# Patient Record
Sex: FEMALE | Race: WHITE | NOT HISPANIC OR LATINO
[De-identification: names, ages, dates, MRNs, and addresses within clinical notes are randomized per-mention and may not be internally consistent; named-entity substitution may affect disease eponyms.]

---
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
## 2020-01-08 ENCOUNTER — TRANSCRIPTION ENCOUNTER (OUTPATIENT)
Age: 20
End: 2020-01-08

## 2020-02-05 ENCOUNTER — TRANSCRIPTION ENCOUNTER (OUTPATIENT)
Age: 20
End: 2020-02-05

## 2020-02-17 ENCOUNTER — TRANSCRIPTION ENCOUNTER (OUTPATIENT)
Age: 20
End: 2020-02-17

## 2020-02-26 ENCOUNTER — APPOINTMENT (OUTPATIENT)
Dept: OTOLARYNGOLOGY | Facility: CLINIC | Age: 20
End: 2020-02-26

## 2020-08-04 ENCOUNTER — TRANSCRIPTION ENCOUNTER (OUTPATIENT)
Age: 20
End: 2020-08-04

## 2020-09-30 ENCOUNTER — APPOINTMENT (OUTPATIENT)
Dept: INTERNAL MEDICINE | Facility: CLINIC | Age: 20
End: 2020-09-30
Payer: COMMERCIAL

## 2020-09-30 ENCOUNTER — NON-APPOINTMENT (OUTPATIENT)
Age: 20
End: 2020-09-30

## 2020-09-30 VITALS
SYSTOLIC BLOOD PRESSURE: 120 MMHG | HEART RATE: 82 BPM | WEIGHT: 142 LBS | DIASTOLIC BLOOD PRESSURE: 70 MMHG | RESPIRATION RATE: 12 BRPM | TEMPERATURE: 97.2 F

## 2020-09-30 VITALS — BODY MASS INDEX: 23.63 KG/M2 | HEIGHT: 65 IN

## 2020-09-30 DIAGNOSIS — Z78.9 OTHER SPECIFIED HEALTH STATUS: ICD-10-CM

## 2020-09-30 LAB — CYTOLOGY CVX/VAG DOC THIN PREP: NORMAL

## 2020-09-30 PROCEDURE — G0442 ANNUAL ALCOHOL SCREEN 15 MIN: CPT | Mod: NC

## 2020-09-30 PROCEDURE — G0444 DEPRESSION SCREEN ANNUAL: CPT | Mod: NC,59

## 2020-09-30 PROCEDURE — 93000 ELECTROCARDIOGRAM COMPLETE: CPT | Mod: 59

## 2020-09-30 PROCEDURE — 99385 PREV VISIT NEW AGE 18-39: CPT | Mod: 25

## 2020-09-30 PROCEDURE — 36415 COLL VENOUS BLD VENIPUNCTURE: CPT

## 2020-09-30 RX ORDER — NORGESTIMATE AND ETHINYL ESTRADIOL 0.25-0.035
0.25-35 KIT ORAL
Refills: 0 | Status: ACTIVE | COMMUNITY

## 2020-10-02 LAB
ALBUMIN SERPL ELPH-MCNC: 4.6 G/DL
ALP BLD-CCNC: 46 U/L
ALT SERPL-CCNC: 25 U/L
ANION GAP SERPL CALC-SCNC: 14 MMOL/L
APPEARANCE: CLEAR
AST SERPL-CCNC: 25 U/L
BACTERIA: NEGATIVE
BASOPHILS # BLD AUTO: 0.03 K/UL
BASOPHILS NFR BLD AUTO: 0.4 %
BILIRUB SERPL-MCNC: 0.3 MG/DL
BILIRUBIN URINE: NEGATIVE
BLOOD URINE: NEGATIVE
BUN SERPL-MCNC: 10 MG/DL
CALCIUM SERPL-MCNC: 10 MG/DL
CHLORIDE SERPL-SCNC: 103 MMOL/L
CHOLEST SERPL-MCNC: 300 MG/DL
CHOLEST/HDLC SERPL: 3.4 RATIO
CO2 SERPL-SCNC: 23 MMOL/L
COLOR: COLORLESS
CREAT SERPL-MCNC: 0.8 MG/DL
EOSINOPHIL # BLD AUTO: 0.17 K/UL
EOSINOPHIL NFR BLD AUTO: 2.4 %
GLUCOSE QUALITATIVE U: NEGATIVE
GLUCOSE SERPL-MCNC: 84 MG/DL
HCT VFR BLD CALC: 42.1 %
HDLC SERPL-MCNC: 88 MG/DL
HGB BLD-MCNC: 13.2 G/DL
HYALINE CASTS: 0 /LPF
IMM GRANULOCYTES NFR BLD AUTO: 0.1 %
KETONES URINE: NEGATIVE
LDLC SERPL CALC-MCNC: 163 MG/DL
LEUKOCYTE ESTERASE URINE: NEGATIVE
LYMPHOCYTES # BLD AUTO: 3.05 K/UL
LYMPHOCYTES NFR BLD AUTO: 43.3 %
MAN DIFF?: NORMAL
MCHC RBC-ENTMCNC: 28.1 PG
MCHC RBC-ENTMCNC: 31.4 GM/DL
MCV RBC AUTO: 89.6 FL
MICROSCOPIC-UA: NORMAL
MONOCYTES # BLD AUTO: 0.51 K/UL
MONOCYTES NFR BLD AUTO: 7.2 %
NEUTROPHILS # BLD AUTO: 3.28 K/UL
NEUTROPHILS NFR BLD AUTO: 46.6 %
NITRITE URINE: NEGATIVE
PH URINE: 6.5
PLATELET # BLD AUTO: 306 K/UL
POTASSIUM SERPL-SCNC: 4.3 MMOL/L
PROT SERPL-MCNC: 7 G/DL
PROTEIN URINE: NEGATIVE
RBC # BLD: 4.7 M/UL
RBC # FLD: 12.5 %
RED BLOOD CELLS URINE: 0 /HPF
SARS-COV-2 IGG SERPL IA-ACNC: 6.48 AU/ML
SARS-COV-2 IGG SERPL QL IA: NEGATIVE
SODIUM SERPL-SCNC: 140 MMOL/L
SPECIFIC GRAVITY URINE: 1
SQUAMOUS EPITHELIAL CELLS: 2 /HPF
TRIGL SERPL-MCNC: 247 MG/DL
TSH SERPL-ACNC: 1.37 UIU/ML
UROBILINOGEN URINE: NORMAL
WBC # FLD AUTO: 7.05 K/UL
WHITE BLOOD CELLS URINE: 1 /HPF

## 2020-10-02 NOTE — HEALTH RISK ASSESSMENT
[Very Good] : ~his/her~ current health as very good [Good] : ~his/her~  mood as  good [Yes] : Yes [2 - 4 times a month (2 pts)] : 2-4 times a month (2 points) [1 or 2 (0 pts)] : 1 or 2 (0 points) [Never (0 pts)] : Never (0 points) [No] : In the past 12 months have you used drugs other than those required for medical reasons? No [No falls in past year] : Patient reported no falls in the past year [0] : 2) Feeling down, depressed, or hopeless: Not at all (0) [Patient reported PAP Smear was normal] : Patient reported PAP Smear was normal [None] : None [Employed] : employed [With Family] : lives with family [College] : College [Sexually Active] : sexually active [Single] : single [Fully functional (bathing, dressing, toileting, transferring, walking, feeding)] : Fully functional (bathing, dressing, toileting, transferring, walking, feeding) [Fully functional (using the telephone, shopping, preparing meals, housekeeping, doing laundry, using] : Fully functional and needs no help or supervision to perform IADLs (using the telephone, shopping, preparing meals, housekeeping, doing laundry, using transportation, managing medications and managing finances) [Smoke Detector] : smoke detector [Carbon Monoxide Detector] : carbon monoxide detector [Seat Belt] :  uses seat belt [Sunscreen] : uses sunscreen [Designated Healthcare Proxy] : Designated healthcare proxy [Name: ___] : Health Care Proxy's Name: [unfilled]  [] : No [Audit-CScore] : 2 [de-identified] : exercising [de-identified] : good [[id number]] : 0 [Change in mental status noted] : No change in mental status noted [High Risk Behavior] : no high risk behavior [Reports changes in hearing] : Reports no changes in hearing [Reports changes in vision] : Reports no changes in vision [Reports changes in dental health] : Reports no changes in dental health [PapSmearDate] : 08/20 [FreeTextEntry2] :  [AdvancecareDate] : 09/20

## 2020-10-02 NOTE — ADDENDUM
[FreeTextEntry1] : Blood work good other than surprisingly cholesterol significantly increased at 300 with triglycerides at 240 with good HDL.\par The patient follows a good diet and exercises regularly.\par I spoke with patient's mother who is not aware of any previous cholesterol results.\par Plan is to repeat in one month.

## 2020-10-02 NOTE — ASSESSMENT
[FreeTextEntry1] : A 20-year-old female with good general medical health with 2 chronic medical issues.\par \par #1 persisting upper abdominal symptoms including bloating, increased gas and belching for questionable etiology.\par No improvement with gluten-free diet, avoidance of lactose and acid blocking.\par Possible IBS.\par Referral given to see GI.\par \par #2 chronic upper back and neck pain with associated headaches starting about 4 years ago after a fall while playing soccer with potential concussion and whiplash-like injury.\par No help with physical therapy.\par refer to back surgeon.\par \par patient encouraged to continue diet and exercise\par GYN up-to-date\par Safe sex discussed\par \par Influenza vaccine declined\par Venipuncture done today in the office\par \par followup yearly and as needed

## 2020-10-02 NOTE — PHYSICAL EXAM
[No Acute Distress] : no acute distress [Well Nourished] : well nourished [Well Developed] : well developed [Well-Appearing] : well-appearing [Normal Sclera/Conjunctiva] : normal sclera/conjunctiva [PERRL] : pupils equal round and reactive to light [EOMI] : extraocular movements intact [Normal Outer Ear/Nose] : the outer ears and nose were normal in appearance [Normal Oropharynx] : the oropharynx was normal [No JVD] : no jugular venous distention [No Lymphadenopathy] : no lymphadenopathy [Supple] : supple [Thyroid Normal, No Nodules] : the thyroid was normal and there were no nodules present [No Respiratory Distress] : no respiratory distress  [No Accessory Muscle Use] : no accessory muscle use [Clear to Auscultation] : lungs were clear to auscultation bilaterally [Normal Rate] : normal rate  [Regular Rhythm] : with a regular rhythm [Normal S1, S2] : normal S1 and S2 [No Murmur] : no murmur heard [No Carotid Bruits] : no carotid bruits [No Abdominal Bruit] : a ~M bruit was not heard ~T in the abdomen [No Varicosities] : no varicosities [Pedal Pulses Present] : the pedal pulses are present [No Edema] : there was no peripheral edema [No Palpable Aorta] : no palpable aorta [No Extremity Clubbing/Cyanosis] : no extremity clubbing/cyanosis [Soft] : abdomen soft [Non Tender] : non-tender [Non-distended] : non-distended [No Masses] : no abdominal mass palpated [No HSM] : no HSM [Normal Bowel Sounds] : normal bowel sounds [Normal Posterior Cervical Nodes] : no posterior cervical lymphadenopathy [Normal Anterior Cervical Nodes] : no anterior cervical lymphadenopathy [No CVA Tenderness] : no CVA  tenderness [No Spinal Tenderness] : no spinal tenderness [No Joint Swelling] : no joint swelling [Grossly Normal Strength/Tone] : grossly normal strength/tone [No Rash] : no rash [Coordination Grossly Intact] : coordination grossly intact [No Focal Deficits] : no focal deficits [Normal Gait] : normal gait [Normal Affect] : the affect was normal [Normal Insight/Judgement] : insight and judgment were intact [de-identified] : Positive spasm bilateral trapezius muscles

## 2020-10-02 NOTE — HISTORY OF PRESENT ILLNESS
[FreeTextEntry1] : Yearly physical [de-identified] : 20-year-old female presents to this office for the first time for her yearly physical.\par \par Gen. health is good without any cardiopulmonary, hepatorenal, hematological or diabetes.\par \par Patient with 2 chronic medical issues:\par #1 patient with about 6 months of upper abdominal symptoms that persist and at times get worse. She states at about 6 years old she had upper abdominal symptoms including reflux and had an endoscopy. Her symptoms improved until about 6 months ago.\par The patient describes his symptoms as recurrent upper abdominal bloating and increased gas without nausea or vomiting. Occasional diffuse upper pain but usually not too painful.\par Bowels are normal without diarrhea.\par Patient has tried gluten-free diet without benefit, avoiding lactose without benefit and a number of over-the-counter treatments including Melania-Wilsey, Pepto-Bismol and Pepcid without benefit.\par \par #2 patient with chronic upper back and neck pain with associated posterior headaches which started about 4 years ago when patient was in college playing high-level soccer and fell down and hit the back of her head. It was felt she had a concussion.\par She continues with chronic neck pain.\par She been doing physical therapy without much benefit.\par \par Patient also was mildly ill June 2020 and was positive for COVID PCR> no sequela since other than questionable worsening GI symptoms.\par \par Otherwise she feels well without chest pain, palpitations, shortness of breath or edema.

## 2020-10-21 ENCOUNTER — APPOINTMENT (OUTPATIENT)
Dept: PHYSICAL MEDICINE AND REHAB | Facility: CLINIC | Age: 20
End: 2020-10-21
Payer: COMMERCIAL

## 2020-10-21 VITALS
SYSTOLIC BLOOD PRESSURE: 136 MMHG | DIASTOLIC BLOOD PRESSURE: 74 MMHG | HEIGHT: 65 IN | BODY MASS INDEX: 23.32 KG/M2 | HEART RATE: 94 BPM | WEIGHT: 140 LBS

## 2020-10-21 PROCEDURE — 99204 OFFICE O/P NEW MOD 45 MIN: CPT

## 2020-10-21 NOTE — HISTORY OF PRESENT ILLNESS
[FreeTextEntry1] : Ms. JAYA DAVIS  is a 20 year old female who presents with chronic neck/upper back pain pain. \par \par Location:Upper back/neck, worst over L scapula\par Onset:Started a few years ago after soccer incident. She fell and hit back of head, felt like it was a concussion. \par Provocation/Palliative:Better when head is down but constant ache, can craete difficulty sleeping\par Quality:Achiness, consistent\par Radiation:Some radiation up to occiput region\par Severity:5/10, can be severe\par Timing:Not improving with time\par \par Denies any associated numbness. Denies any associated arm or hand weakness. Denies any loss of bowel/bladder control or any groin numbness.\par Previous medications trialed:Mobic (doesn't remember if it helped)\par Previous procedures relevant to complaint:Had a possible trigger point injection, not sure\par Has tried conservative treatment?:PT/Chiropractor/Acupuncture without much relief\par

## 2020-10-21 NOTE — ASSESSMENT
[FreeTextEntry1] : After review of the history, physical examination, and imaging, the patient's symptoms are consistent with chronic myofascial type pain.  The diagnosis was discussed in detail with the patient. We discussed all the potential treatment options including physical therapy, oral medication, and interventional procedures as well as alternative therapeutics such as acupuncture and massage. We also discussed the importance of  ergonomics, and posture in the long term management of the condition. At this time, will recommend the following:\par -MRI L scapula as pain has been persistent over 3+ years, without significant relief from NSAIDs, PT, acupuncture or chiropractic care\par -Gave patient Flexiril 5mg Qhs PRN to help with pain, especially at night\par -Patient to continue PT\par - Referred patient to Dr. Tapia to evaluate for possible osteopathic manipulative medicine treatment\par -RTC in 3-4 weeks\par \par The patient expressed verbal understanding and is in agreement with the plan of care. All of the patient's questions and concerns were addressed during today's visit.

## 2020-11-02 ENCOUNTER — APPOINTMENT (OUTPATIENT)
Dept: INTERNAL MEDICINE | Facility: CLINIC | Age: 20
End: 2020-11-02
Payer: COMMERCIAL

## 2020-11-02 VITALS
HEIGHT: 65 IN | DIASTOLIC BLOOD PRESSURE: 80 MMHG | SYSTOLIC BLOOD PRESSURE: 125 MMHG | TEMPERATURE: 98.2 F | BODY MASS INDEX: 23.32 KG/M2 | RESPIRATION RATE: 13 BRPM | OXYGEN SATURATION: 98 % | WEIGHT: 140 LBS | HEART RATE: 80 BPM

## 2020-11-02 DIAGNOSIS — B00.9 HERPESVIRAL INFECTION, UNSPECIFIED: ICD-10-CM

## 2020-11-02 PROCEDURE — 90715 TDAP VACCINE 7 YRS/> IM: CPT

## 2020-11-02 PROCEDURE — 99213 OFFICE O/P EST LOW 20 MIN: CPT | Mod: 25

## 2020-11-02 PROCEDURE — 99072 ADDL SUPL MATRL&STAF TM PHE: CPT

## 2020-11-02 PROCEDURE — 36415 COLL VENOUS BLD VENIPUNCTURE: CPT

## 2020-11-02 PROCEDURE — 90471 IMMUNIZATION ADMIN: CPT

## 2020-11-02 NOTE — ASSESSMENT
[FreeTextEntry1] : Valtrex renewed X 7months as pt will need while away in [facility].Patient to follow a low cholesterol diet, to come in for blood draw tomorrow as she is not fasting today (venipuncture to be done in 11/30/20).TDAP given w/o reaction. RTO for CP when due\par \par \par \par TDAP=given\par flu shot=declines

## 2020-11-02 NOTE — HISTORY OF PRESENT ILLNESS
[FreeTextEntry8] : Patient presents complaining of\par 1. Valtrex renewal, takes Valtrex 500 mg daily for genital herpes suppression, going away X 7months to [place] to play soccer\par 2. TDAP booster\par 3. Overdue for repeat cholesterol but not currently fasting, last level was significantly elevated at 300

## 2020-11-04 LAB
CHOLEST SERPL-MCNC: 219 MG/DL
HDLC SERPL-MCNC: 69 MG/DL
LDLC SERPL CALC-MCNC: 110 MG/DL
NONHDLC SERPL-MCNC: 149 MG/DL
TRIGL SERPL-MCNC: 198 MG/DL

## 2020-11-04 RX ORDER — VALACYCLOVIR 500 MG/1
500 TABLET, FILM COATED ORAL
Qty: 210 | Refills: 1 | Status: ACTIVE | COMMUNITY

## 2020-11-05 ENCOUNTER — NON-APPOINTMENT (OUTPATIENT)
Age: 20
End: 2020-11-05

## 2020-11-11 ENCOUNTER — APPOINTMENT (OUTPATIENT)
Dept: PHYSICAL MEDICINE AND REHAB | Facility: CLINIC | Age: 20
End: 2020-11-11
Payer: COMMERCIAL

## 2020-11-11 VITALS
TEMPERATURE: 97.6 F | HEART RATE: 69 BPM | WEIGHT: 140 LBS | DIASTOLIC BLOOD PRESSURE: 68 MMHG | SYSTOLIC BLOOD PRESSURE: 116 MMHG | HEIGHT: 65 IN | BODY MASS INDEX: 23.32 KG/M2

## 2020-11-11 PROCEDURE — 99072 ADDL SUPL MATRL&STAF TM PHE: CPT

## 2020-11-11 PROCEDURE — 99213 OFFICE O/P EST LOW 20 MIN: CPT

## 2020-11-11 NOTE — ASSESSMENT
[FreeTextEntry1] : Ms. JAYA DAVIS is a 20 year old  female who presents with chronic L scaupla/neck/upper back pain. 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 She will continue PT until she leaves and complete her HEP while away. Patient has enough cyclobenzaprine at home to take with her but may need refill in future. Patient to follow up as needed when she returns to the [place]. Patient in agreement with plan. Patient educated on red flag signs including any changes to his bowel/bladder control, groin numbness or new weakness. Patient knows to seek immediate attention by calling 911 or going to nearest ER if these symptoms appear.

## 2020-11-11 NOTE — PHYSICAL EXAM
[FreeTextEntry1] : PE:\par Constitutional: In NAD, calm and cooperative\par HEENT: NCAT, Anicteric sclera, no lid-lag\par Cardio: Extremities appear pink and well perfused, no peripheral edema\par Respiratory: Normal respiratory effort on room air, no accessory muscle use\par Skin: no rashes seen on exposed skin, no visible abrasions\par Psych: Normal affect, intact judgment and insight\par Neuro: Awake, alert and oriented x 3, see below for focused neurological exam\par MSK (Neck):\par 	Inspection: no gross swelling identified\par 	Palpation: Tenderness of the bilateral L>R lower cervical paraspinals, TTP over left scapula\par 	ROM: Pain at end cervical extension and flexion but AROM intact. Shoulder ROM itnact.

## 2020-11-11 NOTE — HISTORY OF PRESENT ILLNESS
[FreeTextEntry1] : Ms. JAYA DAVIS is a 20 year old  female who presents for follow up for L scapula pain. Patient never received a call about the Scapula MRI so she never had it done. Cyclobenzaprine does help her muscles relax and therefore she is able to sleep but pain has not overall improved. 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 She fell and hit back of head, felt like it was a concussion. \par Provocation/Palliative:Better when head is down but constant ache, can create difficulty sleeping\par Quality:Achiness, consistent\par Radiation:Some radiation up to occiput region\par Severity:5-6/10, can be severe\par Timing:Not improving with time\par \par No bowel/bladder changes. No groin numbness.

## 2020-11-14 ENCOUNTER — TRANSCRIPTION ENCOUNTER (OUTPATIENT)
Age: 20
End: 2020-11-14

## 2020-11-15 ENCOUNTER — NON-APPOINTMENT (OUTPATIENT)
Age: 20
End: 2020-11-15

## 2021-06-12 ENCOUNTER — TRANSCRIPTION ENCOUNTER (OUTPATIENT)
Age: 21
End: 2021-06-12

## 2021-06-15 ENCOUNTER — NON-APPOINTMENT (OUTPATIENT)
Age: 21
End: 2021-06-15

## 2021-06-21 ENCOUNTER — APPOINTMENT (OUTPATIENT)
Dept: INTERNAL MEDICINE | Facility: CLINIC | Age: 21
End: 2021-06-21
Payer: COMMERCIAL

## 2021-06-21 ENCOUNTER — NON-APPOINTMENT (OUTPATIENT)
Age: 21
End: 2021-06-21

## 2021-06-21 VITALS
OXYGEN SATURATION: 98 % | HEART RATE: 84 BPM | WEIGHT: 152 LBS | TEMPERATURE: 97.8 F | BODY MASS INDEX: 25.33 KG/M2 | RESPIRATION RATE: 11 BRPM | HEIGHT: 65 IN

## 2021-06-21 DIAGNOSIS — K21.9 GASTRO-ESOPHAGEAL REFLUX DISEASE W/OUT ESOPHAGITIS: ICD-10-CM

## 2021-06-21 DIAGNOSIS — U07.1 COVID-19: ICD-10-CM

## 2021-06-21 PROCEDURE — 93000 ELECTROCARDIOGRAM COMPLETE: CPT

## 2021-06-21 PROCEDURE — 99072 ADDL SUPL MATRL&STAF TM PHE: CPT

## 2021-06-21 PROCEDURE — 99214 OFFICE O/P EST MOD 30 MIN: CPT | Mod: 25

## 2021-06-21 NOTE — PHYSICAL EXAM
[No Acute Distress] : no acute distress [No Respiratory Distress] : no respiratory distress  [Clear to Auscultation] : lungs were clear to auscultation bilaterally [Normal Rate] : normal rate  [Regular Rhythm] : with a regular rhythm [Soft] : abdomen soft [Non Tender] : non-tender [Normal Bowel Sounds] : normal bowel sounds [No Focal Deficits] : no focal deficits [Normal Affect] : the affect was normal [Normal Mood] : the mood was normal

## 2021-06-21 NOTE — ASSESSMENT
[FreeTextEntry1] : EKG shows no acute changes, offered cardiology evaluation which patient declined. Gastro/neuro referral given. Rx given to have labs done at the lab. Patient will return to the office for CPE when applicable\par \par Dr. Melendrez was present in office building while I examined patient\par

## 2021-06-21 NOTE — HISTORY OF PRESENT ILLNESS
[FreeTextEntry8] : Patient presents with multiple complaints including\par 1. History of covid one year ago, just received first covid vaccine on 6/18/21. Patient is very concerned about her heart. Patient states she had tachycardia with her illness one year ago and feels like when she got the vaccine her heart rate was elevated at 94. Patient is asymptomatic without chest pain/palpitations/dyspnea, she is exercising and doing her normal activity but wants EKG done\par \par 2. Patient also states she has history of ADD in college and has been off of Adderall for greater than one year, she is working as a nanny and feels as though she is difficulty even driving and focusing on the road therefore would like medication renewed. Patient states she was tested in college\par \par 3. Patient also states she has had GERD/belching and would like to see Gastro. Patient tried Pepcid/diet changes without benefit, no dysphagia\par \par 4. Patient also would like to have blood work done to recheck her cholesterol and screen her for diabetes but she is not fasting

## 2021-07-07 NOTE — PHYSICAL EXAM
[FreeTextEntry1] : PE:\par Constitutional: In NAD, calm and cooperative\par HEENT: NCAT, Anicteric sclera, no lid-lag\par Cardio: Extremities appear pink and well perfused, no peripheral edema\par Respiratory: Normal respiratory effort on room air, no accessory muscle use\par Skin: no rashes seen on exposed skin, no visible abrasions\par Psych: Normal affect, intact judgment and insight\par Neuro: Awake, alert and oriented x 3, see below for focused neurological exam\par MSK (Neck):\par 	Inspection: no gross swelling identified\par 	Palpation: Tenderness of the bilateral L>R lower cervical paraspinals, TTP over left scapula\par 	ROM: Pain at end cervical extension and flexion but AROM intact\par 	Strength: 5/5 strength in bilateral upper extremities\par 	Reflexes: 2+ Biceps/Brachioradialis reflex bilaterally, Antoine’s negative bilaterally\par 	Sensation: Intact to light touch in bilateral upper extremities\par 	Special tests: Spurling’s test negative bilaterally,
no

## 2021-10-27 NOTE — COUNSELING
[None] : None [Good understanding] : Patient has a good understanding of lifestyle changes and steps needed to achieve self management goal Information: Selecting Yes will display possible errors in your note based on the variables you have selected. This validation is only offered as a suggestion for you. PLEASE NOTE THAT THE VALIDATION TEXT WILL BE REMOVED WHEN YOU FINALIZE YOUR NOTE. IF YOU WANT TO FAX A PRELIMINARY NOTE YOU WILL NEED TO TOGGLE THIS TO 'NO' IF YOU DO NOT WANT IT IN YOUR FAXED NOTE.

## 2021-10-28 ENCOUNTER — APPOINTMENT (OUTPATIENT)
Dept: INTERNAL MEDICINE | Facility: CLINIC | Age: 21
End: 2021-10-28
Payer: COMMERCIAL

## 2021-10-28 ENCOUNTER — NON-APPOINTMENT (OUTPATIENT)
Age: 21
End: 2021-10-28

## 2021-10-28 VITALS
HEIGHT: 65 IN | TEMPERATURE: 97.1 F | DIASTOLIC BLOOD PRESSURE: 70 MMHG | BODY MASS INDEX: 23.66 KG/M2 | RESPIRATION RATE: 11 BRPM | HEART RATE: 62 BPM | SYSTOLIC BLOOD PRESSURE: 110 MMHG | WEIGHT: 142 LBS

## 2021-10-28 PROCEDURE — G0442 ANNUAL ALCOHOL SCREEN 15 MIN: CPT

## 2021-10-28 PROCEDURE — 99395 PREV VISIT EST AGE 18-39: CPT | Mod: 25

## 2021-10-28 PROCEDURE — 36415 COLL VENOUS BLD VENIPUNCTURE: CPT

## 2021-10-28 PROCEDURE — G0444 DEPRESSION SCREEN ANNUAL: CPT | Mod: 59

## 2021-10-28 PROCEDURE — 93000 ELECTROCARDIOGRAM COMPLETE: CPT | Mod: 59

## 2021-10-28 NOTE — ASSESSMENT
[FreeTextEntry1] : 21-year-old female with good general medical health with 2 chronic medical issues.\par \par #1 persisting upper abdominal symptoms including bloating, increased gas and belching for questionable etiology.\par No improvement with gluten-free diet, avoidance of lactose and acid blocking.\par Possible IBS.\par Again told to see GI\par \par #2 chronic upper back and neck pain with associated headaches starting about 4 years ago after a fall while playing soccer with potential concussion and whiplash-like injury.\par No help with physical therapy.\par Patient to followup with physiatry\par \par History of ADD with patient feeling she has a continued issue therefore again told to followup with neurology\par \par patient encouraged to continue diet and exercise\par GYN up-to-date\par Safe sex discussed\par \par Influenza vaccine declined\par Recieved the COVID vaccine\par Venipuncture done today in the office\par \par followup yearly and as needed

## 2021-10-28 NOTE — HISTORY OF PRESENT ILLNESS
[de-identified] : 21-year-old female presents for her yearly physical.\par The patient was first seen in this office September 2020.\par \par Gen. 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 She states at about 6 years old she had upper abdominal symptoms including reflux and had an endoscopy. Her symptoms improved until about 6 months ago.\par The patient describes his symptoms as recurrent upper abdominal bloating and increased gas without nausea or vomiting. Occasional diffuse upper pain but usually not too painful.\par Bowels are normal without diarrhea.\par Patient has tried gluten-free diet without benefit, avoiding lactose without benefit and a number of over-the-counter treatments including Melania-[place], Pepto-Bismol and Pepcid without benefit.\par Symptoms continue but not as bad and it was recommended that she see GI but has not.\par \par #2 patient with chronic upper back and neck pain with associated posterior headaches which started about 4 years ago when patient was in college playing high-level soccer and fell down and hit the back of her head. It was felt she had a concussion.\par She continues with chronic neck pain.\par He saw a physiatry S2 recommended getting MRIs but these were not done secondary patient was going to [place]. Since her return she has not recontacted with him. Symptoms continue.\par She had done physical therapy without much benefit.\par \par The patient also reports that when she was a teenager she had ADD and still feel she has an issue and requested treatment but she had been given a referral to neurology which she has not done as yet.\par \par Patient also was mildly ill June 2020 and was positive for COVID PCR> no sequela since other than questionable worsening GI symptoms.\par \par Otherwise she feels well without chest pain, palpitations, shortness of breath or edema.\par \par The patient is currently not  and living in [place] and working as a [employer]

## 2021-10-28 NOTE — PHYSICAL EXAM
[Well Nourished] : well nourished [Well Developed] : well developed [Well-Appearing] : well-appearing [Normal Sclera/Conjunctiva] : normal sclera/conjunctiva [PERRL] : pupils equal round and reactive to light [EOMI] : extraocular movements intact [Normal Outer Ear/Nose] : the outer ears and nose were normal in appearance [Normal Oropharynx] : the oropharynx was normal [No JVD] : no jugular venous distention [No Lymphadenopathy] : no lymphadenopathy [Supple] : supple [Thyroid Normal, No Nodules] : the thyroid was normal and there were no nodules present [No Respiratory Distress] : no respiratory distress  [No Accessory Muscle Use] : no accessory muscle use [Clear to Auscultation] : lungs were clear to auscultation bilaterally [Normal Rate] : normal rate  [Regular Rhythm] : with a regular rhythm [Normal S1, S2] : normal S1 and S2 [No Murmur] : no murmur heard [No Carotid Bruits] : no carotid bruits [No Abdominal Bruit] : a ~M bruit was not heard ~T in the abdomen [No Varicosities] : no varicosities [Pedal Pulses Present] : the pedal pulses are present [No Edema] : there was no peripheral edema [No Palpable Aorta] : no palpable aorta [No Extremity Clubbing/Cyanosis] : no extremity clubbing/cyanosis [Soft] : abdomen soft [Non Tender] : non-tender [Non-distended] : non-distended [No Masses] : no abdominal mass palpated [No HSM] : no HSM [Normal Posterior Cervical Nodes] : no posterior cervical lymphadenopathy [Normal Bowel Sounds] : normal bowel sounds [Normal Anterior Cervical Nodes] : no anterior cervical lymphadenopathy [No CVA Tenderness] : no CVA  tenderness [No Spinal Tenderness] : no spinal tenderness [No Joint Swelling] : no joint swelling [Grossly Normal Strength/Tone] : grossly normal strength/tone [No Rash] : no rash [Coordination Grossly Intact] : coordination grossly intact [No Focal Deficits] : no focal deficits [Normal Gait] : normal gait [Normal Affect] : the affect was normal [Normal Insight/Judgement] : insight and judgment were intact [de-identified] : Positive spasm bilateral trapezius muscles

## 2021-10-28 NOTE — HEALTH RISK ASSESSMENT
[Yes] : Yes [2 - 4 times a month (2 pts)] : 2-4 times a month (2 points) [1 or 2 (0 pts)] : 1 or 2 (0 points) [Never (0 pts)] : Never (0 points) [No] : In the past 12 months have you used drugs other than those required for medical reasons? No [No falls in past year] : Patient reported no falls in the past year [0] : 2) Feeling down, depressed, or hopeless: Not at all (0) [Patient reported PAP Smear was normal] : Patient reported PAP Smear was normal [None] : None [With Family] : lives with family [Employed] : employed [College] : College [Single] : single [Sexually Active] : sexually active [Fully functional (bathing, dressing, toileting, transferring, walking, feeding)] : Fully functional (bathing, dressing, toileting, transferring, walking, feeding) [Fully functional (using the telephone, shopping, preparing meals, housekeeping, doing laundry, using] : Fully functional and needs no help or supervision to perform IADLs (using the telephone, shopping, preparing meals, housekeeping, doing laundry, using transportation, managing medications and managing finances) [Smoke Detector] : smoke detector [Carbon Monoxide Detector] : carbon monoxide detector [Seat Belt] :  uses seat belt [Sunscreen] : uses sunscreen [Very Good] : ~his/her~  mood as very good [] : No [Audit-CScore] : 2 [de-identified] : exercising [de-identified] : good [[id number]] : 0 [Change in mental status noted] : No change in mental status noted [High Risk Behavior] : no high risk behavior [Reports changes in hearing] : Reports no changes in hearing [Reports changes in vision] : Reports no changes in vision [Reports changes in dental health] : Reports no changes in dental health [PapSmearDate] : 09/21 [FreeTextEntry2] :

## 2021-10-29 ENCOUNTER — NON-APPOINTMENT (OUTPATIENT)
Age: 21
End: 2021-10-29

## 2021-10-29 LAB
ALBUMIN SERPL ELPH-MCNC: 4.7 G/DL
ALP BLD-CCNC: 54 U/L
ALT SERPL-CCNC: 14 U/L
ANION GAP SERPL CALC-SCNC: 13 MMOL/L
APPEARANCE: CLEAR
AST SERPL-CCNC: 21 U/L
BACTERIA: NEGATIVE
BASOPHILS # BLD AUTO: 0.03 K/UL
BASOPHILS NFR BLD AUTO: 0.4 %
BILIRUB SERPL-MCNC: 0.3 MG/DL
BILIRUBIN URINE: NEGATIVE
BLOOD URINE: NEGATIVE
BUN SERPL-MCNC: 10 MG/DL
CALCIUM SERPL-MCNC: 10.1 MG/DL
CHLORIDE SERPL-SCNC: 104 MMOL/L
CHOLEST SERPL-MCNC: 283 MG/DL
CO2 SERPL-SCNC: 22 MMOL/L
COLOR: NORMAL
CREAT SERPL-MCNC: 0.84 MG/DL
EOSINOPHIL # BLD AUTO: 0.09 K/UL
EOSINOPHIL NFR BLD AUTO: 1.2 %
ESTIMATED AVERAGE GLUCOSE: 88 MG/DL
GLUCOSE QUALITATIVE U: NEGATIVE
GLUCOSE SERPL-MCNC: 78 MG/DL
HBA1C MFR BLD HPLC: 4.7 %
HCT VFR BLD CALC: 40.6 %
HDLC SERPL-MCNC: 71 MG/DL
HGB BLD-MCNC: 12.7 G/DL
HYALINE CASTS: 1 /LPF
IMM GRANULOCYTES NFR BLD AUTO: 0.1 %
KETONES URINE: NEGATIVE
LDLC SERPL CALC-MCNC: 160 MG/DL
LEUKOCYTE ESTERASE URINE: NEGATIVE
LYMPHOCYTES # BLD AUTO: 3.31 K/UL
LYMPHOCYTES NFR BLD AUTO: 45.6 %
MAN DIFF?: NORMAL
MCHC RBC-ENTMCNC: 27.3 PG
MCHC RBC-ENTMCNC: 31.3 GM/DL
MCV RBC AUTO: 87.1 FL
MICROSCOPIC-UA: NORMAL
MONOCYTES # BLD AUTO: 0.48 K/UL
MONOCYTES NFR BLD AUTO: 6.6 %
NEUTROPHILS # BLD AUTO: 3.34 K/UL
NEUTROPHILS NFR BLD AUTO: 46.1 %
NITRITE URINE: NEGATIVE
NONHDLC SERPL-MCNC: 212 MG/DL
PH URINE: 6
PLATELET # BLD AUTO: 324 K/UL
POTASSIUM SERPL-SCNC: 4.1 MMOL/L
PROT SERPL-MCNC: 7.2 G/DL
PROTEIN URINE: NEGATIVE
RBC # BLD: 4.66 M/UL
RBC # FLD: 12.9 %
RED BLOOD CELLS URINE: 1 /HPF
SODIUM SERPL-SCNC: 138 MMOL/L
SPECIFIC GRAVITY URINE: 1.02
SQUAMOUS EPITHELIAL CELLS: 1 /HPF
TRIGL SERPL-MCNC: 257 MG/DL
UROBILINOGEN URINE: NORMAL
WBC # FLD AUTO: 7.26 K/UL
WHITE BLOOD CELLS URINE: 1 /HPF

## 2021-11-02 ENCOUNTER — APPOINTMENT (OUTPATIENT)
Dept: DERMATOLOGY | Facility: CLINIC | Age: 21
End: 2021-11-02
Payer: COMMERCIAL

## 2021-11-02 PROCEDURE — 99204 OFFICE O/P NEW MOD 45 MIN: CPT

## 2021-11-26 ENCOUNTER — TRANSCRIPTION ENCOUNTER (OUTPATIENT)
Age: 21
End: 2021-11-26

## 2021-11-28 ENCOUNTER — NON-APPOINTMENT (OUTPATIENT)
Age: 21
End: 2021-11-28

## 2021-12-22 ENCOUNTER — APPOINTMENT (OUTPATIENT)
Dept: INTERNAL MEDICINE | Facility: CLINIC | Age: 21
End: 2021-12-22
Payer: COMMERCIAL

## 2021-12-22 ENCOUNTER — TRANSCRIPTION ENCOUNTER (OUTPATIENT)
Age: 21
End: 2021-12-22

## 2021-12-22 VITALS
TEMPERATURE: 98 F | RESPIRATION RATE: 10 BRPM | DIASTOLIC BLOOD PRESSURE: 80 MMHG | BODY MASS INDEX: 23.32 KG/M2 | WEIGHT: 140 LBS | SYSTOLIC BLOOD PRESSURE: 120 MMHG | HEART RATE: 74 BPM | OXYGEN SATURATION: 98 % | HEIGHT: 65 IN

## 2021-12-22 PROCEDURE — 99214 OFFICE O/P EST MOD 30 MIN: CPT | Mod: 25

## 2021-12-22 PROCEDURE — 36415 COLL VENOUS BLD VENIPUNCTURE: CPT

## 2021-12-22 RX ORDER — CYCLOBENZAPRINE HYDROCHLORIDE 5 MG/1
5 TABLET, FILM COATED ORAL
Qty: 30 | Refills: 1 | Status: DISCONTINUED | COMMUNITY
Start: 2020-10-21 | End: 2021-12-22

## 2021-12-23 ENCOUNTER — APPOINTMENT (OUTPATIENT)
Dept: ULTRASOUND IMAGING | Facility: CLINIC | Age: 21
End: 2021-12-23
Payer: COMMERCIAL

## 2021-12-23 ENCOUNTER — OUTPATIENT (OUTPATIENT)
Dept: OUTPATIENT SERVICES | Facility: HOSPITAL | Age: 21
LOS: 1 days | End: 2021-12-23
Payer: COMMERCIAL

## 2021-12-23 DIAGNOSIS — R10.11 RIGHT UPPER QUADRANT PAIN: ICD-10-CM

## 2021-12-23 LAB
ALBUMIN SERPL ELPH-MCNC: 4.9 G/DL
ALP BLD-CCNC: 57 U/L
ALT SERPL-CCNC: 12 U/L
AMYLASE/CREAT SERPL: 65 U/L
ANION GAP SERPL CALC-SCNC: 11 MMOL/L
AST SERPL-CCNC: 19 U/L
BASOPHILS # BLD AUTO: 0.03 K/UL
BASOPHILS NFR BLD AUTO: 0.5 %
BILIRUB SERPL-MCNC: 0.4 MG/DL
BUN SERPL-MCNC: 9 MG/DL
CALCIUM SERPL-MCNC: 10.6 MG/DL
CHLORIDE SERPL-SCNC: 101 MMOL/L
CO2 SERPL-SCNC: 24 MMOL/L
CREAT SERPL-MCNC: 0.84 MG/DL
EOSINOPHIL # BLD AUTO: 0.13 K/UL
EOSINOPHIL NFR BLD AUTO: 2.2 %
GLUCOSE SERPL-MCNC: 83 MG/DL
HCT VFR BLD CALC: 45 %
HGB BLD-MCNC: 14.1 G/DL
IMM GRANULOCYTES NFR BLD AUTO: 0.2 %
LPL SERPL-CCNC: 31 U/L
LYMPHOCYTES # BLD AUTO: 2.65 K/UL
LYMPHOCYTES NFR BLD AUTO: 45.4 %
MAN DIFF?: NORMAL
MCHC RBC-ENTMCNC: 27.1 PG
MCHC RBC-ENTMCNC: 31.3 GM/DL
MCV RBC AUTO: 86.4 FL
MONOCYTES # BLD AUTO: 0.5 K/UL
MONOCYTES NFR BLD AUTO: 8.6 %
NEUTROPHILS # BLD AUTO: 2.52 K/UL
NEUTROPHILS NFR BLD AUTO: 43.1 %
PLATELET # BLD AUTO: 329 K/UL
POTASSIUM SERPL-SCNC: 4.3 MMOL/L
PROT SERPL-MCNC: 7.3 G/DL
RBC # BLD: 5.21 M/UL
RBC # FLD: 12.7 %
SODIUM SERPL-SCNC: 137 MMOL/L
TSH SERPL-ACNC: 0.71 UIU/ML
WBC # FLD AUTO: 5.84 K/UL

## 2021-12-23 PROCEDURE — 76700 US EXAM ABDOM COMPLETE: CPT

## 2021-12-23 PROCEDURE — 71046 X-RAY EXAM CHEST 2 VIEWS: CPT | Mod: 26

## 2021-12-23 PROCEDURE — 71046 X-RAY EXAM CHEST 2 VIEWS: CPT

## 2021-12-23 PROCEDURE — 76700 US EXAM ABDOM COMPLETE: CPT | Mod: 26

## 2021-12-23 NOTE — ASSESSMENT
[FreeTextEntry1] : Venipuncture done in our office, Labs sent out.Abdominal sonogram/chest x-ray ordered. Consider GI evaluation if workup is negative. Further recommendations to follow. Patient will return to the office for CP when applicable\par \par \par Dr. Melendrez was present in office building while I examined patient\par

## 2021-12-23 NOTE — PHYSICAL EXAM
[No Acute Distress] : no acute distress [No Respiratory Distress] : no respiratory distress  [Clear to Auscultation] : lungs were clear to auscultation bilaterally [Normal Rate] : normal rate  [Regular Rhythm] : with a regular rhythm [Normal Affect] : the affect was normal [Normal Mood] : the mood was normal [Soft] : abdomen soft [Normal Bowel Sounds] : normal bowel sounds [de-identified] : +Minimally tender right upper quadrant with negative Harris's

## 2021-12-23 NOTE — HISTORY OF PRESENT ILLNESS
[FreeTextEntry8] : Patient presents complaining of right upper quadrant "pressure" for at least one month. Patient states with this she has had decreased appetite and her weight has gone down and she is getting episodic nausea. Patient has no vomiting.Patient has no referred pain to her back. Patient states her stool on occasion has been loose. Patient has had no change in her diet/supplements, etc, she has been tested for times for covid 4x which was negative.

## 2021-12-23 NOTE — ADDENDUM
[FreeTextEntry1] : Labs show\par -CA elevated at 10.6\par -Lymphocytes cell elevated at 45.4, this has happened on another occasion\par Check 3-4 weeks with flow cytometry

## 2021-12-28 ENCOUNTER — APPOINTMENT (OUTPATIENT)
Dept: DERMATOLOGY | Facility: CLINIC | Age: 21
End: 2021-12-28
Payer: COMMERCIAL

## 2021-12-28 PROCEDURE — D0098: CPT

## 2022-01-01 LAB — SARS-COV-2 N GENE NPH QL NAA+PROBE: DETECTED

## 2022-01-03 ENCOUNTER — TRANSCRIPTION ENCOUNTER (OUTPATIENT)
Age: 22
End: 2022-01-03

## 2022-01-03 ENCOUNTER — NON-APPOINTMENT (OUTPATIENT)
Age: 22
End: 2022-01-03

## 2022-01-06 ENCOUNTER — NON-APPOINTMENT (OUTPATIENT)
Age: 22
End: 2022-01-06

## 2022-01-10 ENCOUNTER — NON-APPOINTMENT (OUTPATIENT)
Age: 22
End: 2022-01-10

## 2022-01-18 ENCOUNTER — APPOINTMENT (OUTPATIENT)
Dept: INTERNAL MEDICINE | Facility: CLINIC | Age: 22
End: 2022-01-18

## 2022-01-24 ENCOUNTER — APPOINTMENT (OUTPATIENT)
Dept: INTERNAL MEDICINE | Facility: CLINIC | Age: 22
End: 2022-01-24
Payer: COMMERCIAL

## 2022-01-24 VITALS
WEIGHT: 138 LBS | SYSTOLIC BLOOD PRESSURE: 120 MMHG | HEART RATE: 69 BPM | TEMPERATURE: 98.6 F | RESPIRATION RATE: 11 BRPM | DIASTOLIC BLOOD PRESSURE: 80 MMHG

## 2022-01-24 PROCEDURE — 99213 OFFICE O/P EST LOW 20 MIN: CPT | Mod: 25

## 2022-01-24 PROCEDURE — 36415 COLL VENOUS BLD VENIPUNCTURE: CPT

## 2022-01-24 RX ORDER — DOXYCYCLINE HYCLATE 20 MG/1
20 TABLET ORAL
Refills: 0 | Status: ACTIVE | COMMUNITY
Start: 2022-01-24

## 2022-01-25 LAB
ANION GAP SERPL CALC-SCNC: 14 MMOL/L
BASOPHILS # BLD AUTO: 0.03 K/UL
BASOPHILS NFR BLD AUTO: 0.5 %
BUN SERPL-MCNC: 8 MG/DL
CALCIUM SERPL-MCNC: 9.8 MG/DL
CHLORIDE SERPL-SCNC: 103 MMOL/L
CHOLEST SERPL-MCNC: 257 MG/DL
CO2 SERPL-SCNC: 23 MMOL/L
CREAT SERPL-MCNC: 0.93 MG/DL
EOSINOPHIL # BLD AUTO: 0.19 K/UL
EOSINOPHIL NFR BLD AUTO: 3.2 %
GLUCOSE SERPL-MCNC: 126 MG/DL
HCT VFR BLD CALC: 41.4 %
HDLC SERPL-MCNC: 70 MG/DL
HGB BLD-MCNC: 13.2 G/DL
IMM GRANULOCYTES NFR BLD AUTO: 0.2 %
LDLC SERPL CALC-MCNC: 133 MG/DL
LYMPHOCYTES # BLD AUTO: 2.91 K/UL
LYMPHOCYTES NFR BLD AUTO: 49.6 %
MAN DIFF?: NORMAL
MCHC RBC-ENTMCNC: 27.4 PG
MCHC RBC-ENTMCNC: 31.9 GM/DL
MCV RBC AUTO: 86.1 FL
MONOCYTES # BLD AUTO: 0.36 K/UL
MONOCYTES NFR BLD AUTO: 6.1 %
NEUTROPHILS # BLD AUTO: 2.37 K/UL
NEUTROPHILS NFR BLD AUTO: 40.4 %
NONHDLC SERPL-MCNC: 188 MG/DL
PLATELET # BLD AUTO: 306 K/UL
POTASSIUM SERPL-SCNC: 3.8 MMOL/L
RBC # BLD: 4.81 M/UL
RBC # FLD: 12.3 %
SODIUM SERPL-SCNC: 139 MMOL/L
TRIGL SERPL-MCNC: 275 MG/DL
WBC # FLD AUTO: 5.87 K/UL

## 2022-01-25 NOTE — ADDENDUM
[FreeTextEntry1] : Labs show\par -lymphs of 49.6 (recurrent issue ?reason) with normal FC=refer to hematology for recs

## 2022-01-25 NOTE — ASSESSMENT
[FreeTextEntry1] : Venipuncture done in our office, Labs sent out. Patient advised to continue present medications with diet/exercise and specialists followup. Patient  will return to the office for CP when due\par \par \par Declines flu shot, +got covid vaccines\par MD's\par 1.PMR-Dr. Mendoza\par 2.GI "to do"\par 3.GYN-Dr. Ruiz=UTD\par 4.Derm-Dr. Aponte\par 5.Neuro "to do"\par 6.Allergist "to do"\par

## 2022-01-25 NOTE — HISTORY OF PRESENT ILLNESS
[FreeTextEntry1] : Followup [de-identified] : Pt presents for followup\par Last labs showed\par -chol was elevated at 283, trying to improve dietarily=NOT FASTING\par -CA of 10.6\par -lymphocyte cells elevated at 45.4\par \par Continues on BCP\par Dermatology prescribed doxycycline for acne\par s/p covid, recovered well

## 2022-01-26 ENCOUNTER — NON-APPOINTMENT (OUTPATIENT)
Age: 22
End: 2022-01-26

## 2022-01-26 ENCOUNTER — TRANSCRIPTION ENCOUNTER (OUTPATIENT)
Age: 22
End: 2022-01-26

## 2022-02-08 ENCOUNTER — APPOINTMENT (OUTPATIENT)
Dept: DERMATOLOGY | Facility: CLINIC | Age: 22
End: 2022-02-08
Payer: COMMERCIAL

## 2022-02-08 PROCEDURE — 99214 OFFICE O/P EST MOD 30 MIN: CPT

## 2022-02-15 ENCOUNTER — OUTPATIENT (OUTPATIENT)
Dept: OUTPATIENT SERVICES | Facility: HOSPITAL | Age: 22
LOS: 1 days | Discharge: HOME | End: 2022-02-15

## 2022-02-15 DIAGNOSIS — D72.820 LYMPHOCYTOSIS (SYMPTOMATIC): ICD-10-CM

## 2022-02-17 ENCOUNTER — RESULT REVIEW (OUTPATIENT)
Age: 22
End: 2022-02-17

## 2022-02-17 ENCOUNTER — APPOINTMENT (OUTPATIENT)
Dept: HEMATOLOGY ONCOLOGY | Facility: CLINIC | Age: 22
End: 2022-02-17
Payer: COMMERCIAL

## 2022-02-17 VITALS
DIASTOLIC BLOOD PRESSURE: 80 MMHG | HEIGHT: 65 IN | BODY MASS INDEX: 23.82 KG/M2 | HEART RATE: 110 BPM | SYSTOLIC BLOOD PRESSURE: 130 MMHG | WEIGHT: 143 LBS | OXYGEN SATURATION: 100 %

## 2022-02-17 DIAGNOSIS — Z80.6 FAMILY HISTORY OF LEUKEMIA: ICD-10-CM

## 2022-02-17 DIAGNOSIS — Z80.1 FAMILY HISTORY OF MALIGNANT NEOPLASM OF TRACHEA, BRONCHUS AND LUNG: ICD-10-CM

## 2022-02-17 LAB
BASOPHILS # BLD AUTO: 0.1 K/UL — SIGNIFICANT CHANGE UP (ref 0–0.2)
BASOPHILS NFR BLD AUTO: 1.2 % — SIGNIFICANT CHANGE UP (ref 0–2)
EOSINOPHIL # BLD AUTO: 0.2 K/UL — SIGNIFICANT CHANGE UP (ref 0–0.5)
EOSINOPHIL NFR BLD AUTO: 2.2 % — SIGNIFICANT CHANGE UP (ref 0–6)
HCT VFR BLD CALC: 42.8 % — SIGNIFICANT CHANGE UP (ref 34.5–45)
HGB BLD-MCNC: 14 G/DL — SIGNIFICANT CHANGE UP (ref 11.5–15.5)
LYMPHOCYTES # BLD AUTO: 3.3 K/UL — SIGNIFICANT CHANGE UP (ref 1–3.3)
LYMPHOCYTES # BLD AUTO: 45.2 % — HIGH (ref 13–44)
MCHC RBC-ENTMCNC: 28 PG — SIGNIFICANT CHANGE UP (ref 27–34)
MCHC RBC-ENTMCNC: 32.6 G/DL — SIGNIFICANT CHANGE UP (ref 32–36)
MCV RBC AUTO: 85.7 FL — SIGNIFICANT CHANGE UP (ref 80–100)
MONOCYTES # BLD AUTO: 0.4 K/UL — SIGNIFICANT CHANGE UP (ref 0–0.9)
MONOCYTES NFR BLD AUTO: 5.6 % — SIGNIFICANT CHANGE UP (ref 2–14)
NEUTROPHILS # BLD AUTO: 3.3 K/UL — SIGNIFICANT CHANGE UP (ref 1.8–7.4)
NEUTROPHILS NFR BLD AUTO: 45.8 % — SIGNIFICANT CHANGE UP (ref 43–77)
PLATELET # BLD AUTO: 319 K/UL — SIGNIFICANT CHANGE UP (ref 150–400)
RBC # BLD: 4.99 M/UL — SIGNIFICANT CHANGE UP (ref 3.8–5.2)
RBC # FLD: 11.7 % — SIGNIFICANT CHANGE UP (ref 10.3–14.5)
WBC # BLD: 7.2 K/UL — SIGNIFICANT CHANGE UP (ref 3.8–10.5)
WBC # FLD AUTO: 7.2 K/UL — SIGNIFICANT CHANGE UP (ref 3.8–10.5)

## 2022-02-17 PROCEDURE — 99203 OFFICE O/P NEW LOW 30 MIN: CPT

## 2022-02-17 NOTE — CONSULT LETTER
[Dear  ___] : Dear  [unfilled], [Consult Letter:] : I had the pleasure of evaluating your patient, [unfilled]. [Please see my note below.] : Please see my note below. [Consult Closing:] : Thank you very much for allowing me to participate in the care of this patient.  If you have any questions, please do not hesitate to contact me. [Sincerely,] : Sincerely, [FreeTextEntry3] : \par Andre Mcneil MD\par [facility]\par [address]\par [place] [address]\par Tel: [phone]\par Email: [email or web address]

## 2022-02-17 NOTE — HISTORY OF PRESENT ILLNESS
[de-identified] : 21 F here for evaluation of lymphocytosis. Labs showed pt has mild lymphocytosis 45-49% since Oct 2021.Total WBC and ALC were normal  Flow cytometry on 1/25 show no immunophenotypic abnormality. Denies URI or sinus symptoms. Had COVID x 2 and fully recovered. Has genital herpes outbreak recently. Now on Valcyte. Denies HA. CP, SOB, abd pain, constipation, diarrhea, melena, hematuria, dysuria.

## 2022-02-17 NOTE — ASSESSMENT
[FreeTextEntry1] : 21 F here for evaluation of lymphocytosis. Labs showed pt has mild lymphocytosis 45-49% since Oct 2021.Total WBC and ALC were normal  Flow cytometry on 1/25 show no immunophenotypic abnormality. Denies URI or sinus symptoms. Had COVID x 2 and fully recovered. Has genital herpes outbreak recently. Now on Valcyte. \par \par \par # mild lyphocytosis %\par -normal absolute lymphocyte counts though % was elevated\par -PBS reviewed: normal RBC with appropriate central pallor, no schistocytes.Granulocytes, lymphocytes appear normal morphologically, no increased in immature cells, adequate platelets, no clumping \par -recent flow cytometry showed no immunophenotypic abnormality \par -low suspicion for underlying heme issue\par -she can be monitored with every 6 months CBC\par \par RTC as needed

## 2022-04-04 ENCOUNTER — APPOINTMENT (OUTPATIENT)
Dept: NEUROLOGY | Facility: CLINIC | Age: 22
End: 2022-04-04
Payer: COMMERCIAL

## 2022-04-04 ENCOUNTER — NON-APPOINTMENT (OUTPATIENT)
Age: 22
End: 2022-04-04

## 2022-04-04 VITALS
DIASTOLIC BLOOD PRESSURE: 70 MMHG | SYSTOLIC BLOOD PRESSURE: 120 MMHG | HEIGHT: 65 IN | WEIGHT: 120 LBS | BODY MASS INDEX: 19.99 KG/M2

## 2022-04-04 PROCEDURE — 99204 OFFICE O/P NEW MOD 45 MIN: CPT

## 2022-04-04 NOTE — HISTORY OF PRESENT ILLNESS
[FreeTextEntry1] : This 21-year-old woman says she has had trouble focusing and concentrating since childhood.  She was diagnosed in 2017 with ADHD during her freshman year at college.  She was at [employer] on a soccer scholarship.  She was prescribed Adderall 10 to 20 mg/day which worked great for her.  She is currently working as a nanny.  She does plan on going back to school in September.  She would currently only need the Adderall on certain days that she needs to concentrate and may need it more when back in school.  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  She is asking for refills on the Adderall.\par \par The other problem she reports is pressure headaches in the back of her head.  These have been going on for many years.  She gets them pretty much daily.  There can be nausea with them.  There is no photophobia or visual disturbance.  She uses Advil infrequently with variable relief.\par \par Medical history otherwise unremarkable.

## 2022-04-04 NOTE — ASSESSMENT
[FreeTextEntry1] : ADHD\par We will continue Adderall 10 to 20 mg a day as needed\par Prescription sent\par \par Headaches\par Likely tension–muscle contraction type of headaches emanating from the cervical region\par We will check a brain MRI and if that looks okay I would recommend some physical therapy\par \par Follow-up by phone once the MRI is resulted.

## 2022-04-04 NOTE — PHYSICAL EXAM
[FreeTextEntry1] : There is no cervical palpation tenderness.\par There is full range of movement of the cervical spine\par  [General Appearance - Alert] : alert [General Appearance - In No Acute Distress] : in no acute distress [General Appearance - Well-Appearing] : healthy appearing [Oriented To Time, Place, And Person] : oriented to person, place, and time [Impaired Insight] : insight and judgment were intact [Affect] : the affect was normal [Memory Recent] : recent memory was not impaired [Person] : oriented to person [Place] : oriented to place [Time] : oriented to time [Concentration Intact] : normal concentrating ability [Fluency] : fluency intact [Comprehension] : comprehension intact [Cranial Nerves Optic (II)] : visual acuity intact bilaterally,  visual fields full to confrontation, pupils equal round and reactive to light [Cranial Nerves Oculomotor (III)] : extraocular motion intact [Cranial Nerves Trigeminal (V)] : facial sensation intact symmetrically [Cranial Nerves Facial (VII)] : face symmetrical [Cranial Nerves Vestibulocochlear (VIII)] : hearing was intact bilaterally [Cranial Nerves Glossopharyngeal (IX)] : tongue and palate midline [Cranial Nerves Accessory (XI - Cranial And Spinal)] : head turning and shoulder shrug symmetric [Cranial Nerves Hypoglossal (XII)] : there was no tongue deviation with protrusion [Motor Tone] : muscle tone was normal in all four extremities [Motor Strength] : muscle strength was normal in all four extremities [No Muscle Atrophy] : normal bulk in all four extremities [Paresis Pronator Drift Right-Sided] : no pronator drift on the right [Paresis Pronator Drift Left-Sided] : no pronator drift on the left [Sensation Tactile Decrease] : light touch was intact [Sensation Pain / Temperature Decrease] : pain and temperature was intact [Romberg's Sign] : Romberg's sign was negtive [Abnormal Walk] : normal gait [Balance] : balance was intact [Past-pointing] : there was no past-pointing [Tremor] : no tremor present [Coordination - Dysmetria Impaired Finger-to-Nose Bilateral] : not present [Coordination - Dysmetria Impaired Heel-to-Shin Bilateral] : not present [2+] : Ankle jerk left 2+ [Plantar Reflex Right Only] : normal on the right [Plantar Reflex Left Only] : normal on the left [PERRL With Normal Accommodation] : pupils were equal in size, round, reactive to light, with normal accommodation [Extraocular Movements] : extraocular movements were intact [Optic Disc Abnormality] : the optic disc were normal in size and color [Full Visual Field] : full visual field

## 2022-04-16 NOTE — REVIEW OF SYSTEMS
Negative] : Heme/Lymph [FreeTextEntry4] : head ache [FreeTextEntry9] : muscular pain DISPLAY PLAN FREE TEXT

## 2022-05-25 ENCOUNTER — NON-APPOINTMENT (OUTPATIENT)
Age: 22
End: 2022-05-25

## 2022-05-25 ENCOUNTER — APPOINTMENT (OUTPATIENT)
Dept: GASTROENTEROLOGY | Facility: CLINIC | Age: 22
End: 2022-05-25
Payer: COMMERCIAL

## 2022-05-25 VITALS
RESPIRATION RATE: 14 BRPM | OXYGEN SATURATION: 98 % | DIASTOLIC BLOOD PRESSURE: 68 MMHG | SYSTOLIC BLOOD PRESSURE: 116 MMHG | HEART RATE: 72 BPM | WEIGHT: 141 LBS | TEMPERATURE: 98.1 F | HEIGHT: 65 IN | BODY MASS INDEX: 23.49 KG/M2

## 2022-05-25 PROCEDURE — 99204 OFFICE O/P NEW MOD 45 MIN: CPT

## 2022-05-25 NOTE — HISTORY OF PRESENT ILLNESS
[Heartburn] : denies heartburn [Vomiting] : denies vomiting [Diarrhea] : stable diarrhea [Constipation] : denies constipation [Yellow Skin Or Eyes (Jaundice)] : denies jaundice [Abdominal Pain] : denies abdominal pain [Abdominal Swelling] : abdominal swelling worsened [Rectal Pain] : denies rectal pain [Nausea] : nausea [de-identified] : JAYA DAVIS is a 21 year old female presenting today with complaints of abdominal bloating, belching, nausea, decreased appetite, and early satiety. She states that she has always had issues with bloating and belching since childhood. However, recently she has developed nausea, early satiety, and decreased appetite. These symptoms occur daily. She has tried Gas X, Pepcid and Omeprazole in the past but found that it made her symptoms worse. She denies acid reflux, vomiting, and weight loss. No prior EGD. She also reports frequent loose stools. She moves her bowels daily and denies any rectal bleeding or black stools. \par Recent abdominal US was unremarkable.

## 2022-05-25 NOTE — ASSESSMENT
[FreeTextEntry1] : 21 year old female presenting with complaints of worsening abdominal bloating, nausea, loss of appetite and early satiety. Will schedule EGD for further evaluation. I have discussed the indications (including but not limited to ruling out Cristina's esophagus, AVM's, H. pylori, and malignancies), benefits, risks (including but not limited to reaction to the anesthesia, infection, bleeding, and perforation), and alternatives to an endoscopy. The patient understands all options and has agreed to endoscopy and is medically optimized for the planned procedure. \par \par Will hold off on PPI and H2 therapy for now as she states that it made symptoms worse\par GERD diet\par FODMAP diet\par \par Labs\par SIBO test

## 2022-05-25 NOTE — REASON FOR VISIT
[Initial Evaluation] : an initial evaluation [FreeTextEntry1] : bloating, belching, decreased appetite, nausea, early satiety, loose stools

## 2022-06-07 ENCOUNTER — APPOINTMENT (OUTPATIENT)
Dept: INTERNAL MEDICINE | Facility: CLINIC | Age: 22
End: 2022-06-07
Payer: COMMERCIAL

## 2022-06-07 VITALS
TEMPERATURE: 97 F | WEIGHT: 140 LBS | HEIGHT: 65 IN | HEART RATE: 87 BPM | SYSTOLIC BLOOD PRESSURE: 120 MMHG | DIASTOLIC BLOOD PRESSURE: 80 MMHG | OXYGEN SATURATION: 100 % | RESPIRATION RATE: 12 BRPM | BODY MASS INDEX: 23.32 KG/M2

## 2022-06-07 PROCEDURE — 99213 OFFICE O/P EST LOW 20 MIN: CPT | Mod: 25

## 2022-06-07 PROCEDURE — 36415 COLL VENOUS BLD VENIPUNCTURE: CPT

## 2022-06-07 NOTE — ASSESSMENT
[FreeTextEntry1] : Venipuncture done in our office, Labs sent out.Form completed and given. RTO For CP in oct/prn\par \par \par Dr. Melendrez was present in office building while I examined patient\par \par declines meningitis vaccine=franki signed for form\par

## 2022-06-07 NOTE — HISTORY OF PRESENT ILLNESS
[FreeTextEntry8] : Pt presents for form\par -feels well\par -has endoscopy scheduled for ongoing stomach issues but symptoms have been better with dietary change

## 2022-06-10 ENCOUNTER — NON-APPOINTMENT (OUTPATIENT)
Age: 22
End: 2022-06-10

## 2022-06-10 ENCOUNTER — TRANSCRIPTION ENCOUNTER (OUTPATIENT)
Age: 22
End: 2022-06-10

## 2022-06-27 ENCOUNTER — NON-APPOINTMENT (OUTPATIENT)
Age: 22
End: 2022-06-27

## 2022-06-30 ENCOUNTER — NON-APPOINTMENT (OUTPATIENT)
Age: 22
End: 2022-06-30

## 2022-07-27 ENCOUNTER — APPOINTMENT (OUTPATIENT)
Dept: DERMATOLOGY | Facility: CLINIC | Age: 22
End: 2022-07-27

## 2022-07-27 PROCEDURE — 99213 OFFICE O/P EST LOW 20 MIN: CPT

## 2022-08-25 ENCOUNTER — NON-APPOINTMENT (OUTPATIENT)
Age: 22
End: 2022-08-25

## 2022-08-25 DIAGNOSIS — G89.29 CERVICALGIA: ICD-10-CM

## 2022-08-25 DIAGNOSIS — M54.2 CERVICALGIA: ICD-10-CM

## 2022-11-07 ENCOUNTER — APPOINTMENT (OUTPATIENT)
Dept: INTERNAL MEDICINE | Facility: CLINIC | Age: 22
End: 2022-11-07

## 2022-12-08 ENCOUNTER — NON-APPOINTMENT (OUTPATIENT)
Age: 22
End: 2022-12-08

## 2022-12-08 ENCOUNTER — APPOINTMENT (OUTPATIENT)
Dept: GASTROENTEROLOGY | Facility: GI CENTER | Age: 22
End: 2022-12-08

## 2022-12-08 ENCOUNTER — APPOINTMENT (OUTPATIENT)
Dept: INTERNAL MEDICINE | Facility: CLINIC | Age: 22
End: 2022-12-08

## 2022-12-08 VITALS
RESPIRATION RATE: 16 BRPM | OXYGEN SATURATION: 99 % | BODY MASS INDEX: 22.49 KG/M2 | DIASTOLIC BLOOD PRESSURE: 70 MMHG | HEART RATE: 62 BPM | SYSTOLIC BLOOD PRESSURE: 110 MMHG | WEIGHT: 135 LBS | HEIGHT: 65 IN

## 2022-12-08 DIAGNOSIS — Z80.8 FAMILY HISTORY OF MALIGNANT NEOPLASM OF OTHER ORGANS OR SYSTEMS: ICD-10-CM

## 2022-12-08 DIAGNOSIS — R87.619 UNSPECIFIED ABNORMAL CYTOLOGICAL FINDINGS IN SPECIMENS FROM CERVIX UTERI: ICD-10-CM

## 2022-12-08 DIAGNOSIS — Z83.438 FAMILY HISTORY OF OTHER DISORDER OF LIPOPROTEIN METABOLISM AND OTHER LIPIDEMIA: ICD-10-CM

## 2022-12-08 DIAGNOSIS — K31.9 DISEASE OF STOMACH AND DUODENUM, UNSPECIFIED: ICD-10-CM

## 2022-12-08 DIAGNOSIS — D72.820 LYMPHOCYTOSIS (SYMPTOMATIC): ICD-10-CM

## 2022-12-08 PROCEDURE — 36415 COLL VENOUS BLD VENIPUNCTURE: CPT

## 2022-12-08 PROCEDURE — 99395 PREV VISIT EST AGE 18-39: CPT | Mod: 25

## 2022-12-08 PROCEDURE — 93000 ELECTROCARDIOGRAM COMPLETE: CPT

## 2022-12-09 ENCOUNTER — NON-APPOINTMENT (OUTPATIENT)
Age: 22
End: 2022-12-09

## 2022-12-09 ENCOUNTER — TRANSCRIPTION ENCOUNTER (OUTPATIENT)
Age: 22
End: 2022-12-09

## 2022-12-09 LAB
ALBUMIN SERPL ELPH-MCNC: 4.3 G/DL
ALP BLD-CCNC: 35 U/L
ALT SERPL-CCNC: 14 U/L
ANION GAP SERPL CALC-SCNC: 9 MMOL/L
APPEARANCE: CLEAR
AST SERPL-CCNC: 26 U/L
BACTERIA: NEGATIVE
BASOPHILS # BLD AUTO: 0.04 K/UL
BASOPHILS NFR BLD AUTO: 0.7 %
BILIRUB SERPL-MCNC: 0.2 MG/DL
BILIRUBIN URINE: NEGATIVE
BLOOD URINE: NEGATIVE
BUN SERPL-MCNC: 8 MG/DL
CALCIUM SERPL-MCNC: 9.8 MG/DL
CHLORIDE SERPL-SCNC: 106 MMOL/L
CHOLEST SERPL-MCNC: 265 MG/DL
CO2 SERPL-SCNC: 23 MMOL/L
COLOR: COLORLESS
CREAT SERPL-MCNC: 1.01 MG/DL
EGFR: 80 ML/MIN/1.73M2
EOSINOPHIL # BLD AUTO: 0.2 K/UL
EOSINOPHIL NFR BLD AUTO: 3.3 %
GLUCOSE QUALITATIVE U: NEGATIVE
GLUCOSE SERPL-MCNC: 81 MG/DL
HCT VFR BLD CALC: 37.8 %
HDLC SERPL-MCNC: 87 MG/DL
HGB BLD-MCNC: 12.4 G/DL
HYALINE CASTS: 1 /LPF
IMM GRANULOCYTES NFR BLD AUTO: 0.2 %
KETONES URINE: NEGATIVE
LDLC SERPL CALC-MCNC: 147 MG/DL
LEUKOCYTE ESTERASE URINE: NEGATIVE
LYMPHOCYTES # BLD AUTO: 2.76 K/UL
LYMPHOCYTES NFR BLD AUTO: 46.1 %
MAN DIFF?: NORMAL
MCHC RBC-ENTMCNC: 28.2 PG
MCHC RBC-ENTMCNC: 32.8 GM/DL
MCV RBC AUTO: 85.9 FL
MICROSCOPIC-UA: NORMAL
MONOCYTES # BLD AUTO: 0.44 K/UL
MONOCYTES NFR BLD AUTO: 7.3 %
NEUTROPHILS # BLD AUTO: 2.54 K/UL
NEUTROPHILS NFR BLD AUTO: 42.4 %
NITRITE URINE: NEGATIVE
NONHDLC SERPL-MCNC: 178 MG/DL
PH URINE: 6.5
PLATELET # BLD AUTO: 297 K/UL
POTASSIUM SERPL-SCNC: 4.5 MMOL/L
PROT SERPL-MCNC: 6.4 G/DL
PROTEIN URINE: NEGATIVE
RBC # BLD: 4.4 M/UL
RBC # FLD: 13 %
RED BLOOD CELLS URINE: 1 /HPF
SODIUM SERPL-SCNC: 139 MMOL/L
SPECIFIC GRAVITY URINE: 1.01
SQUAMOUS EPITHELIAL CELLS: 2 /HPF
TRIGL SERPL-MCNC: 153 MG/DL
TSH SERPL-ACNC: 1.42 UIU/ML
UROBILINOGEN URINE: NORMAL
WBC # FLD AUTO: 5.99 K/UL
WHITE BLOOD CELLS URINE: 0 /HPF

## 2022-12-09 NOTE — ASSESSMENT
[FreeTextEntry1] : Venipuncture done in our office, Labs sent out. Patient advised to continue present medications with diet/exercise and specialists followup.Counseled patient on cigarettes/alcohol/drugs/safe sex practices and encouraged self breast exams. Encouraged routine follow up with dentist /GYN.RTO Q year\par \par \par Flu vaccine=declines\par Received COVID-vaccine x2\par Specialists include\par 1.  GYN=Dr. Tesfaye\par 2.  Neurology–Dr. Yanez\par 3.  GI-Dr. Briceno's group\par 4.  Hematology–Dr. Mcneil=no followup needed\par 5.  Dermatology–Dr. Heaton/Dr. Aponte\par 6.Dentist+\par Endoscopy= to do in the near future\par

## 2022-12-09 NOTE — PHYSICAL EXAM
[No Acute Distress] : no acute distress [Normal Sclera/Conjunctiva] : normal sclera/conjunctiva [PERRL] : pupils equal round and reactive to light [EOMI] : extraocular movements intact [Normal Outer Ear/Nose] : the outer ears and nose were normal in appearance [Normal Oropharynx] : the oropharynx was normal [Normal TMs] : both tympanic membranes were normal [Normal Nasal Mucosa] : the nasal mucosa was normal [No Lymphadenopathy] : no lymphadenopathy [Supple] : supple [No Respiratory Distress] : no respiratory distress  [Clear to Auscultation] : lungs were clear to auscultation bilaterally [Normal Rate] : normal rate  [Regular Rhythm] : with a regular rhythm [No Carotid Bruits] : no carotid bruits [Pedal Pulses Present] : the pedal pulses are present [No Edema] : there was no peripheral edema No [No Extremity Clubbing/Cyanosis] : no extremity clubbing/cyanosis [Soft] : abdomen soft [Non Tender] : non-tender [No Masses] : no abdominal mass palpated [Normal Bowel Sounds] : normal bowel sounds [No CVA Tenderness] : no CVA  tenderness [No Spinal Tenderness] : no spinal tenderness [No Joint Swelling] : no joint swelling [Grossly Normal Strength/Tone] : grossly normal strength/tone [No Rash] : no rash [No Focal Deficits] : no focal deficits [Normal Affect] : the affect was normal [Normal Mood] : the mood was normal [de-identified] : VIA GYN [de-identified] : VIA GYN

## 2022-12-09 NOTE — HEALTH RISK ASSESSMENT
[Very Good] : ~his/her~  mood as very good [Never] : Never [Yes] : Yes [Monthly or less (1 pt)] : Monthly or less (1 point) [3 or 4 (1 pt)] : 3 or 4  (1 point) [Never (0 pts)] : Never (0 points) [No] : In the past 12 months have you used drugs other than those required for medical reasons? No [0] : 2) Feeling down, depressed, or hopeless: Not at all (0) [Alone] : lives alone [Employed] : employed [College] : College [Single] : single [Sexually Active] : sexually active [Feels Safe at Home] : Feels safe at home [Fully functional (bathing, dressing, toileting, transferring, walking, feeding)] : Fully functional (bathing, dressing, toileting, transferring, walking, feeding) [Fully functional (using the telephone, shopping, preparing meals, housekeeping, doing laundry, using] : Fully functional and needs no help or supervision to perform IADLs (using the telephone, shopping, preparing meals, housekeeping, doing laundry, using transportation, managing medications and managing finances) [Smoke Detector] : smoke detector [Carbon Monoxide Detector] : carbon monoxide detector [Seat Belt] :  uses seat belt [Sunscreen] : uses sunscreen [Audit-CScore] : 2 points [FreeTextEntry2] : [employer]  for [employer]s soccer [de-identified] : BA, in grad school

## 2022-12-09 NOTE — HISTORY OF PRESENT ILLNESS
[de-identified] : 22-year-old female presents for CPE\par Medical history includes\par -ADHD, on Adderall and following with neurology\par -Chronic neck pain/headache syndrome–following with neurology, doing PT with +benefit\par -Hyperlipidemia, trying to improve dietarily\par -Lymphocytosis, following with hematology\par -HSV 2, on Valtrex as needed\par -Stomach issues, following with GI, has endoscopy sched\par -History of COVID\par -perioral dermatitis=on doxy\par -History of atypical cells on Pap, reports recent last 2 as normal\par \par Denies chest pain/palpitations/dyspnea\par -Received COVID-vaccine x2\par -Continues on birth control

## 2023-02-17 ENCOUNTER — NON-APPOINTMENT (OUTPATIENT)
Age: 23
End: 2023-02-17

## 2023-02-21 ENCOUNTER — NON-APPOINTMENT (OUTPATIENT)
Age: 23
End: 2023-02-21

## 2023-02-27 ENCOUNTER — APPOINTMENT (OUTPATIENT)
Dept: DERMATOLOGY | Facility: CLINIC | Age: 23
End: 2023-02-27

## 2023-07-01 ENCOUNTER — NON-APPOINTMENT (OUTPATIENT)
Age: 23
End: 2023-07-01

## 2023-07-02 ENCOUNTER — EMERGENCY (EMERGENCY)
Facility: HOSPITAL | Age: 23
LOS: 1 days | Discharge: HOME | End: 2023-07-02
Attending: STUDENT IN AN ORGANIZED HEALTH CARE EDUCATION/TRAINING PROGRAM
Payer: COMMERCIAL

## 2023-07-02 VITALS
HEART RATE: 71 BPM | DIASTOLIC BLOOD PRESSURE: 68 MMHG | HEIGHT: 65 IN | RESPIRATION RATE: 18 BRPM | WEIGHT: 134.92 LBS | TEMPERATURE: 98 F | OXYGEN SATURATION: 98 % | SYSTOLIC BLOOD PRESSURE: 118 MMHG

## 2023-07-02 PROCEDURE — 99283 EMERGENCY DEPT VISIT LOW MDM: CPT

## 2023-07-02 PROCEDURE — 99282 EMERGENCY DEPT VISIT SF MDM: CPT

## 2023-07-02 NOTE — ED ADULT TRIAGE NOTE - CHIEF COMPLAINT QUOTE
pt states she got left middle fingernail caught in the comforter, nailbed is exposed  A&Ox3, resp wnl, sent from urgent care

## 2023-07-02 NOTE — ED ADULT NURSE NOTE - OBJECTIVE STATEMENT
pt is a 21 y/o/f who presents after getting her L first finger caught on bed comforter. lac to nail. SARWAT lane at bedside

## 2023-07-02 NOTE — ED PROVIDER NOTE - CARE PROVIDERS DIRECT ADDRESSES
Body Location Override (Optional - Billing Will Still Be Based On Selected Body Map Location If Applicable): left posterior neck Detail Level: Detailed Add 89813 Cpt? (Important Note: In 2017 The Use Of 20040 Is Being Tracked By Cms To Determine Future Global Period Reimbursement For Global Periods): no ,DirectAddress_Unknown 2

## 2023-07-02 NOTE — ED ADULT NURSE NOTE - PRIMARY CARE PROVIDER
Medicare Preventive Visit Patient Instructions  Thank you for completing your Welcome to Medicare Visit or Medicare Annual Wellness Visit today  Your next wellness visit will be due in one year (10/9/2020)  The screening/preventive services that you may require over the next 5-10 years are detailed below  Some tests may not apply to you based off risk factors and/or age  Screening tests ordered at today's visit but not completed yet may show as past due  Also, please note that scanned in results may not display below  Preventive Screenings:  Service Recommendations Previous Testing/Comments   Colorectal Cancer Screening  · Colonoscopy    · Fecal Occult Blood Test (FOBT)/Fecal Immunochemical Test (FIT)  · Fecal DNA/Cologuard Test  · Flexible Sigmoidoscopy Age: 54-65 years old   Colonoscopy: every 10 years (May be performed more frequently if at higher risk)  OR  FOBT/FIT: every 1 year  OR  Cologuard: every 3 years  OR  Sigmoidoscopy: every 5 years  Screening may be recommended earlier than age 48 if at higher risk for colorectal cancer  Also, an individualized decision between you and your healthcare provider will decide whether screening between the ages of 74-80 would be appropriate   Colonoscopy: Not on file  FOBT/FIT: Not on file  Cologuard: Not on file  Sigmoidoscopy: Not on file         Prostate Cancer Screening Individualized decision between patient and health care provider in men between ages of 53-78   Medicare will cover every 12 months beginning on the day after your 50th birthday PSA: 0 3 ng/mL     Screening Not Indicated     Hepatitis C Screening Once for adults born between [facility]  More frequently in patients at high risk for Hepatitis C Hep C Antibody: Not on file       Diabetes Screening 1-2 times per year if you're at risk for diabetes or have pre-diabetes Fasting glucose: No results in last 5 years   A1C: No results in last 5 years    Screening Current   Cholesterol Screening Once every 5 years if you don't have a lipid disorder  May order more often based on risk factors  Lipid panel: 10/19/2018    Screening Not Indicated  History Lipid Disorder      Other Preventive Screenings Covered by Medicare:  1  Abdominal Aortic Aneurysm (AAA) Screening: covered once if your at risk  You're considered to be at risk if you have a family history of AAA or a male between the age of 73-68 who smoking at least 100 cigarettes in your lifetime  2  Lung Cancer Screening: covers low dose CT scan once per year if you meet all of the following conditions: (1) Age 50-69; (2) No signs or symptoms of lung cancer; (3) Current smoker or have quit smoking within the last 15 years; (4) You have a tobacco smoking history of at least 30 pack years (packs per day x number of years you smoked); (5) You get a written order from a healthcare provider  3  Glaucoma Screening: covered annually if you're considered high risk: (1) You have diabetes OR (2) Family history of glaucoma OR (3)  aged 48 and older OR (3)  American aged 72 and older  3  Osteoporosis Screening: covered every 2 years if you meet one of the following conditions: (1) Have a vertebral abnormality; (2) On glucocorticoid therapy for more than 3 months; (3) Have primary hyperparathyroidism; (4) On osteoporosis medications and need to assess response to drug therapy  5  HIV Screening: covered annually if you're between the age of 12-76  Also covered annually if you are younger than 13 and older than 72 with risk factors for HIV infection  For pregnant patients, it is covered up to 3 times per pregnancy      Immunizations:  Immunization Recommendations   Influenza Vaccine Annual influenza vaccination during flu season is recommended for all persons aged >= 6 months who do not have contraindications   Pneumococcal Vaccine (Prevnar and Pneumovax)  * Prevnar = PCV13  * Pneumovax = PPSV23 Adults 25-60 years old: 1-3 doses may be recommended based on certain risk factors  Adults 72 years old: Prevnar (PCV13) vaccine recommended followed by Pneumovax (PPSV23) vaccine  If already received PPSV23 since turning 65, then PCV13 recommended at least one year after PPSV23 dose  Hepatitis B Vaccine 3 dose series if at intermediate or high risk (ex: diabetes, end stage renal disease, liver disease)   Tetanus (Td) Vaccine - COST NOT COVERED BY MEDICARE PART B Following completion of primary series, a booster dose should be given every 10 years to maintain immunity against tetanus  Td may also be given as tetanus wound prophylaxis  Tdap Vaccine - COST NOT COVERED BY MEDICARE PART B Recommended at least once for all adults  For pregnant patients, recommended with each pregnancy  Shingles Vaccine (Shingrix) - COST NOT COVERED BY MEDICARE PART B  2 shot series recommended in those aged 48 and above     Health Maintenance Due:  There are no preventive care reminders to display for this patient  Immunizations Due:      Topic Date Due    HEPATITIS B VACCINES (1 of 3 - Risk 3-dose series) 02/23/1960    DTaP,Tdap,and Td Vaccines (1 - Tdap) 02/23/1960    INFLUENZA VACCINE  07/01/2019     Advance Directives   What are advance directives? Advance directives are legal documents that state your wishes and plans for medical care  These plans are made ahead of time in case you lose your ability to make decisions for yourself  Advance directives can apply to any medical decision, such as the treatments you want, and if you want to donate organs  What are the types of advance directives? There are many types of advance directives, and each state has rules about how to use them  You may choose a combination of any of the following:  · Living will: This is a written record of the treatment you want  You can also choose which treatments you do not want, which to limit, and which to stop at a certain time  This includes surgery, medicine, IV fluid, and tube feedings     · Durable power of  for healthcare [place] [facility]): This is a written record that states who you want to make healthcare choices for you when you are unable to make them for yourself  This person, called a proxy, is usually a family member or a friend  You may choose more than 1 proxy  · Do not resuscitate (DNR) order:  A DNR order is used in case your heart stops beating or you stop breathing  It is a request not to have certain forms of treatment, such as CPR  A DNR order may be included in other types of advance directives  · Medical directive: This covers the care that you want if you are in a coma, near death, or unable to make decisions for yourself  You can list the treatments you want for each condition  Treatment may include pain medicine, surgery, blood transfusions, dialysis, IV or tube feedings, and a ventilator (breathing machine)  · Values history: This document has questions about your views, beliefs, and how you feel and think about life  This information can help others choose the care that you would choose  Why are advance directives important? An advance directive helps you control your care  Although spoken wishes may be used, it is better to have your wishes written down  Spoken wishes can be misunderstood, or not followed  Treatments may be given even if you do not want them  An advance directive may make it easier for your family to make difficult choices about your care  © Copyright [employer] 2018 Information is for End User's use only and may not be sold, redistributed or otherwise used for commercial purposes   All illustrations and images included in CareNotes® are the copyrighted property of [employer]  or [address] [place], [place] [address]  unk

## 2023-07-02 NOTE — ED PROVIDER NOTE - NS ED ATTENDING STATEMENT MOD
This was a shared visit with the MARLO. I reviewed and verified the documentation and independently performed the documented:

## 2023-07-02 NOTE — ED PROVIDER NOTE - PATIENT PORTAL LINK FT
You can access the FollowMyHealth Patient Portal offered by [facility] by registering at the following website: [email or web address]. By joining [facility]’s FollowMyHealth portal, you will also be able to view your health information using other applications (apps) compatible with our system.

## 2023-07-02 NOTE — ED PROVIDER NOTE - OBJECTIVE STATEMENT
23 y/o F c/o nail avulsion of left index finger which occurred just prior to arrival.  Patient states that she got it caught on a bed comforter.  Patient up to date on tetanus.

## 2023-07-02 NOTE — ED PROVIDER NOTE - CARE PROVIDER_API CALL
Lanette Lezama  Orthopaedic Surgery  [address] [place] [address]  Phone: [phone]  Fax: [phone]  Follow Up Time:

## 2023-07-05 ENCOUNTER — NON-APPOINTMENT (OUTPATIENT)
Age: 23
End: 2023-07-05

## 2023-08-03 ENCOUNTER — APPOINTMENT (OUTPATIENT)
Dept: NEUROLOGY | Facility: CLINIC | Age: 23
End: 2023-08-03
Payer: COMMERCIAL

## 2023-08-03 PROCEDURE — 99214 OFFICE O/P EST MOD 30 MIN: CPT | Mod: 95

## 2023-08-03 NOTE — HISTORY OF PRESENT ILLNESS
[FreeTextEntry1] : I saw her initially 4/4/2022 with the following history This 21-year-old woman says she has had trouble focusing and concentrating since childhood.  She was diagnosed in 2017 with ADHD during her freshman year at college.  She was at [employer] on a soccer scholarship.  She was prescribed Adderall 10 to 20 mg/day which worked great for her.  She is currently working as a nanny.  She does plan on going back to school in September.  She would currently only need the Adderall on certain days that she needs to concentrate and may need it more when back in school.  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  She is asking for refills on the Adderall.  The other problem she reports is pressure headaches in the back of her head.  These have been going on for many years.  She gets them pretty much daily.  There can be nausea with them.  There is no photophobia or visual disturbance.  She uses Advil infrequently with variable relief.  Medical history otherwise unremarkable.  She was seen today, 8/3/23 for telehealth visit.  This is the first time I am seeing her since the initial evaluation. 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

## 2023-08-03 NOTE — PHYSICAL EXAM
[FreeTextEntry1] : Exam limited due to limitations of telephonic visit   Mental status: Alert, fully oriented, speech comprehension intact, recent memory intact  Cranial nerves: No ptosis  Extraocular movements full. Facial strength and sensation are normal. Tongue midline.  Motor: Strength grossly normal throughout. There is no drift. Unable to assess tone. No abnormal movements observed.  Sensation: Intact to touch throughout  Coordination: Finger-nose intact  Reflexes unable to check

## 2023-08-03 NOTE — ASSESSMENT
[FreeTextEntry1] : ADHD We will continue Adderall Prescription sent  Headaches Resolved  Follow-up in 6 months and by phone prior to that as needed

## 2023-09-15 ENCOUNTER — APPOINTMENT (OUTPATIENT)
Dept: ORTHOPEDIC SURGERY | Facility: CLINIC | Age: 23
End: 2023-09-15

## 2023-09-30 ENCOUNTER — NON-APPOINTMENT (OUTPATIENT)
Age: 23
End: 2023-09-30

## 2023-10-03 ENCOUNTER — NON-APPOINTMENT (OUTPATIENT)
Age: 23
End: 2023-10-03

## 2023-10-25 ENCOUNTER — NON-APPOINTMENT (OUTPATIENT)
Age: 23
End: 2023-10-25

## 2023-10-27 ENCOUNTER — NON-APPOINTMENT (OUTPATIENT)
Age: 23
End: 2023-10-27

## 2023-11-10 ENCOUNTER — NON-APPOINTMENT (OUTPATIENT)
Age: 23
End: 2023-11-10

## 2023-11-12 ENCOUNTER — NON-APPOINTMENT (OUTPATIENT)
Age: 23
End: 2023-11-12

## 2023-12-07 PROBLEM — L71.0 PERIORAL DERMATITIS: Status: ACTIVE | Noted: 2023-12-07

## 2023-12-11 ENCOUNTER — APPOINTMENT (OUTPATIENT)
Dept: INTERNAL MEDICINE | Facility: CLINIC | Age: 23
End: 2023-12-11
Payer: COMMERCIAL

## 2023-12-11 ENCOUNTER — NON-APPOINTMENT (OUTPATIENT)
Age: 23
End: 2023-12-11

## 2023-12-11 ENCOUNTER — LABORATORY RESULT (OUTPATIENT)
Age: 23
End: 2023-12-11

## 2023-12-11 VITALS
HEIGHT: 65 IN | DIASTOLIC BLOOD PRESSURE: 70 MMHG | RESPIRATION RATE: 11 BRPM | SYSTOLIC BLOOD PRESSURE: 110 MMHG | HEART RATE: 62 BPM | BODY MASS INDEX: 21.99 KG/M2 | WEIGHT: 132 LBS

## 2023-12-11 DIAGNOSIS — Z13.9 ENCOUNTER FOR SCREENING, UNSPECIFIED: ICD-10-CM

## 2023-12-11 DIAGNOSIS — Z00.00 ENCOUNTER FOR GENERAL ADULT MEDICAL EXAMINATION W/OUT ABNORMAL FINDINGS: ICD-10-CM

## 2023-12-11 DIAGNOSIS — Z02.89 ENCOUNTER FOR OTHER ADMINISTRATIVE EXAMINATIONS: ICD-10-CM

## 2023-12-11 DIAGNOSIS — L71.0 PERIORAL DERMATITIS: ICD-10-CM

## 2023-12-11 DIAGNOSIS — Z11.1 ENCOUNTER FOR SCREENING FOR RESPIRATORY TUBERCULOSIS: ICD-10-CM

## 2023-12-11 DIAGNOSIS — R68.89 OTHER GENERAL SYMPTOMS AND SIGNS: ICD-10-CM

## 2023-12-11 PROCEDURE — 93000 ELECTROCARDIOGRAM COMPLETE: CPT

## 2023-12-11 PROCEDURE — 99395 PREV VISIT EST AGE 18-39: CPT | Mod: 25

## 2023-12-11 PROCEDURE — 36415 COLL VENOUS BLD VENIPUNCTURE: CPT

## 2023-12-14 ENCOUNTER — TRANSCRIPTION ENCOUNTER (OUTPATIENT)
Age: 23
End: 2023-12-14

## 2023-12-14 LAB
ALBUMIN SERPL ELPH-MCNC: 4.2 G/DL
ALP BLD-CCNC: 40 U/L
ALT SERPL-CCNC: 10 U/L
ANION GAP SERPL CALC-SCNC: 13 MMOL/L
APPEARANCE: CLEAR
AST SERPL-CCNC: 16 U/L
BACTERIA: NEGATIVE /HPF
BASOPHILS # BLD AUTO: 0.06 K/UL
BASOPHILS NFR BLD AUTO: 0.8 %
BILIRUB SERPL-MCNC: 0.4 MG/DL
BILIRUBIN URINE: NEGATIVE
BLOOD URINE: NEGATIVE
BUN SERPL-MCNC: 11 MG/DL
CALCIUM SERPL-MCNC: 10.1 MG/DL
CAST: 0 /LPF
CHLORIDE SERPL-SCNC: 101 MMOL/L
CHOLEST SERPL-MCNC: 268 MG/DL
CO2 SERPL-SCNC: 23 MMOL/L
COLOR: YELLOW
CREAT SERPL-MCNC: 0.94 MG/DL
EGFR: 86 ML/MIN/1.73M2
EOSINOPHIL # BLD AUTO: 0.36 K/UL
EOSINOPHIL NFR BLD AUTO: 5 %
EPITHELIAL CELLS: 2 /HPF
GLUCOSE QUALITATIVE U: NEGATIVE MG/DL
GLUCOSE SERPL-MCNC: 77 MG/DL
HCT VFR BLD CALC: 41 %
HDLC SERPL-MCNC: 76 MG/DL
HGB BLD-MCNC: 13.2 G/DL
IMM GRANULOCYTES NFR BLD AUTO: 0.1 %
KETONES URINE: NEGATIVE MG/DL
LDLC SERPL CALC-MCNC: 159 MG/DL
LEUKOCYTE ESTERASE URINE: NEGATIVE
LYMPHOCYTES # BLD AUTO: 3.3 K/UL
LYMPHOCYTES NFR BLD AUTO: 46.1 %
M TB IFN-G BLD-IMP: NEGATIVE
MAN DIFF?: NORMAL
MCHC RBC-ENTMCNC: 26.9 PG
MCHC RBC-ENTMCNC: 32.2 GM/DL
MCV RBC AUTO: 83.7 FL
MICROSCOPIC-UA: NORMAL
MONOCYTES # BLD AUTO: 0.49 K/UL
MONOCYTES NFR BLD AUTO: 6.8 %
NEUTROPHILS # BLD AUTO: 2.94 K/UL
NEUTROPHILS NFR BLD AUTO: 41.2 %
NITRITE URINE: NEGATIVE
NONHDLC SERPL-MCNC: 192 MG/DL
PH URINE: 6.5
PLATELET # BLD AUTO: 329 K/UL
POTASSIUM SERPL-SCNC: 4.1 MMOL/L
PROT SERPL-MCNC: 6.7 G/DL
PROTEIN URINE: NEGATIVE MG/DL
QUANTIFERON TB PLUS MITOGEN MINUS NIL: 3.64 IU/ML
QUANTIFERON TB PLUS NIL: 0.02 IU/ML
QUANTIFERON TB PLUS TB1 MINUS NIL: 0.04 IU/ML
QUANTIFERON TB PLUS TB2 MINUS NIL: 0.03 IU/ML
RBC # BLD: 4.9 M/UL
RBC # FLD: 12.4 %
RED BLOOD CELLS URINE: 1 /HPF
SODIUM SERPL-SCNC: 138 MMOL/L
SPECIFIC GRAVITY URINE: 1.01
TRIGL SERPL-MCNC: 183 MG/DL
TSH SERPL-ACNC: 1.88 UIU/ML
UROBILINOGEN URINE: 0.2 MG/DL
WBC # FLD AUTO: 7.16 K/UL
WHITE BLOOD CELLS URINE: 0 /HPF

## 2023-12-14 NOTE — HEALTH RISK ASSESSMENT
[Audit-CScore] : 2 points [FreeTextEntry2] :  [de-identified] : BA, in grad school [FreeTextEntry3] : +boyfriend

## 2023-12-14 NOTE — HISTORY OF PRESENT ILLNESS
[de-identified] : 23-year-old female presents for CPE Medical history includes -ADHD, on Adderall and following with neurology -Chronic neck pain -Hyperlipidemia, trying to improve dietarily -Lymphocytosis, status post hematology evaluation -HSV 2, on Valtrex as needed -Stomach issues /GERD, following with GI was supposed to do endoscopy but never did,  -History of COVID -perioral dermatitis=on doxy -History of atypical cells on Pap, reports recent last 2 as normal  Denies chest pain/palpitations/dyspnea -Received COVID-vaccine x2 -Continues on birth control  -needs form completed while here -Requesting Lyme test -Complaining on the left neck swollen ? lymph node on and off for years

## 2023-12-14 NOTE — ASSESSMENT
[FreeTextEntry1] : Neck/thyroid sonogram ordered.  Form completed pending blood work.  Venipuncture done in our office, Labs sent out. Patient advised to continue present medications with diet/exercise and specialists followup.Counseled patient on cigarettes/alcohol/drugs/safe sex practices and encouraged self breast exams. Encouraged routine follow up with dentist /GYN.RTO Q year  Dr. Melendrez was present in office building while I examined patient   Flu vaccine=declines Received COVID-vaccine x2 Specialists include 1. GYN=Dr. [facility] 2. Neurology-Dr. Yanez 3. GI-Dr. [facility]eno[facility] 4. Hematology-Dr. Mcneil=no followup needed 5. Dermatology-Dr. Heaton/Dr. Aponte 6.Dentist+ Endoscopy="never did" "to do" .

## 2023-12-21 ENCOUNTER — APPOINTMENT (OUTPATIENT)
Dept: DERMATOLOGY | Facility: CLINIC | Age: 23
End: 2023-12-21
Payer: COMMERCIAL

## 2023-12-21 PROCEDURE — 99214 OFFICE O/P EST MOD 30 MIN: CPT

## 2024-02-27 ENCOUNTER — APPOINTMENT (OUTPATIENT)
Dept: DERMATOLOGY | Facility: CLINIC | Age: 24
End: 2024-02-27
Payer: COMMERCIAL

## 2024-02-27 PROCEDURE — 99214 OFFICE O/P EST MOD 30 MIN: CPT

## 2024-02-29 ENCOUNTER — APPOINTMENT (OUTPATIENT)
Dept: DERMATOLOGY | Facility: CLINIC | Age: 24
End: 2024-02-29

## 2024-03-01 ENCOUNTER — APPOINTMENT (OUTPATIENT)
Dept: DERMATOLOGY | Facility: CLINIC | Age: 24
End: 2024-03-01
Payer: COMMERCIAL

## 2024-03-01 ENCOUNTER — RESULT REVIEW (OUTPATIENT)
Age: 24
End: 2024-03-01

## 2024-03-01 PROCEDURE — 11102 TANGNTL BX SKIN SINGLE LES: CPT

## 2024-04-03 ENCOUNTER — APPOINTMENT (OUTPATIENT)
Dept: INTERNAL MEDICINE | Facility: CLINIC | Age: 24
End: 2024-04-03
Payer: COMMERCIAL

## 2024-04-03 NOTE — ASSESSMENT
[FreeTextEntry1] : Venipuncture done in our office, Labs sent out. Patient advised to continue present medications with diet/exercise and specialists followup. Encouraged routine follow up with dentist /GYN.RTO Q year    Flu vaccine=declines Received COVID-vaccine x2 Specialists include 1. GYN=Dr. Tesfaye [facility] 2. Neurology-Dr. Yanez 3. GI-Dr. [facility] 4. Hematology- [facility]=no followup needed 5. Dermatology-Dr. Heaton/Dr. Aponte 6.Dentist+ Endoscopy="never did" "to do" neck/thyroid sono "to do" CT CA score "may do" .

## 2024-05-21 ENCOUNTER — APPOINTMENT (OUTPATIENT)
Dept: INTERNAL MEDICINE | Facility: CLINIC | Age: 24
End: 2024-05-21
Payer: COMMERCIAL

## 2024-05-22 ENCOUNTER — APPOINTMENT (OUTPATIENT)
Dept: NEUROLOGY | Facility: CLINIC | Age: 24
End: 2024-05-22
Payer: COMMERCIAL

## 2024-05-22 DIAGNOSIS — F90.9 ATTENTION-DEFICIT HYPERACTIVITY DISORDER, UNSPECIFIED TYPE: ICD-10-CM

## 2024-05-22 PROCEDURE — G2211 COMPLEX E/M VISIT ADD ON: CPT | Mod: NC,1L

## 2024-05-22 PROCEDURE — 99214 OFFICE O/P EST MOD 30 MIN: CPT

## 2024-05-22 RX ORDER — DEXTROAMPHETAMINE SACCHARATE, AMPHETAMINE ASPARTATE, DEXTROAMPHETAMINE SULFATE AND AMPHETAMINE SULFATE 3.75; 3.75; 3.75; 3.75 MG/1; MG/1; MG/1; MG/1
15 TABLET ORAL TWICE DAILY
Qty: 60 | Refills: 0 | Status: ACTIVE | COMMUNITY
Start: 2022-04-04 | End: 1900-01-01

## 2024-05-22 NOTE — ASSESSMENT
[FreeTextEntry1] : ADHD We will continue Adderall Prescription sent  Headaches Resolved  Follow-up in 1 year and by phone prior to that as needed

## 2024-05-22 NOTE — HISTORY OF PRESENT ILLNESS
[FreeTextEntry1] : I saw her initially 4/4/2022 with the following history This 21-year-old woman says she has had trouble focusing and concentrating since childhood.  She was diagnosed in 2017 with ADHD during her freshman year at college.  She was at [employer] on a soccer scholarship.  She was prescribed Adderall 10 to 20 mg/day which worked great for her.  She is currently working as a nanny.  She does plan on going back to school in September.  She would currently only need the Adderall on certain days that she needs to concentrate and may need it more when back in school.  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  She is asking for refills on the Adderall.  The other problem she reports is pressure headaches in the back of her head.  These have been going on for many years.  She gets them pretty much daily.  There can be nausea with them.  There is no photophobia or visual disturbance.  She uses Advil infrequently with variable relief.  Medical history otherwise unremarkable.  She was seen  8/3/23 for telehealth visit.  This is the first time I am seeing her since the initial evaluation. 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

## 2024-05-22 NOTE — REASON FOR VISIT
[Follow-Up: _____] : a [unfilled] follow-up visit [Home] : at home, [unfilled] , at the time of the visit. [Patient] : the patient

## 2024-06-05 ENCOUNTER — APPOINTMENT (OUTPATIENT)
Dept: INTERNAL MEDICINE | Facility: CLINIC | Age: 24
End: 2024-06-05
Payer: COMMERCIAL

## 2024-06-05 VITALS
HEIGHT: 65 IN | SYSTOLIC BLOOD PRESSURE: 120 MMHG | HEART RATE: 62 BPM | OXYGEN SATURATION: 98 % | BODY MASS INDEX: 21.66 KG/M2 | WEIGHT: 130 LBS | RESPIRATION RATE: 11 BRPM | DIASTOLIC BLOOD PRESSURE: 78 MMHG

## 2024-06-05 DIAGNOSIS — E78.5 HYPERLIPIDEMIA, UNSPECIFIED: ICD-10-CM

## 2024-06-05 PROCEDURE — 36415 COLL VENOUS BLD VENIPUNCTURE: CPT

## 2024-06-05 PROCEDURE — 99213 OFFICE O/P EST LOW 20 MIN: CPT

## 2024-06-05 PROCEDURE — G2211 COMPLEX E/M VISIT ADD ON: CPT | Mod: NC

## 2024-06-05 NOTE — HISTORY OF PRESENT ILLNESS
[de-identified] : Pt presents for follow up on hyperlipidemia, trying to improve dietarily -pt is fasting -looking for teaching job

## 2024-06-05 NOTE — ASSESSMENT
[FreeTextEntry1] :  Venipuncture done in our office, Labs sent out. Patient advised to continue present medications with diet/exercise and specialists followup.RTO for CP in dec  Flu vaccine=declines Received COVID-vaccines Specialists include 1. GYN=Dr. Tesf[facility] 2. Neurology-Dr. Yanez 3. GI-Dr. [facility]eno[facility] 4. Hematology-Dr. Mcneil=no followup needed 5. Dermatology-Dr. Heaton/Dr. Aponte 6.Dentist+ CT CA score "to do"=rx given neck/thyroid sono "to do"=rx given

## 2024-06-06 LAB
CHOLEST SERPL-MCNC: 226 MG/DL
HDLC SERPL-MCNC: 74 MG/DL
LDLC SERPL CALC-MCNC: 128 MG/DL
NONHDLC SERPL-MCNC: 152 MG/DL
TRIGL SERPL-MCNC: 139 MG/DL

## 2024-07-01 ENCOUNTER — APPOINTMENT (OUTPATIENT)
Dept: DERMATOLOGY | Facility: CLINIC | Age: 24
End: 2024-07-01

## 2024-07-30 ENCOUNTER — OUTPATIENT (OUTPATIENT)
Dept: OUTPATIENT SERVICES | Facility: HOSPITAL | Age: 24
LOS: 1 days | End: 2024-07-30
Payer: COMMERCIAL

## 2024-07-30 ENCOUNTER — APPOINTMENT (OUTPATIENT)
Dept: ULTRASOUND IMAGING | Facility: CLINIC | Age: 24
End: 2024-07-30
Payer: COMMERCIAL

## 2024-07-30 DIAGNOSIS — M54.2 CERVICALGIA: ICD-10-CM

## 2024-07-30 DIAGNOSIS — R68.89 OTHER GENERAL SYMPTOMS AND SIGNS: ICD-10-CM

## 2024-07-30 PROCEDURE — 76536 US EXAM OF HEAD AND NECK: CPT | Mod: 26

## 2024-07-30 PROCEDURE — 76536 US EXAM OF HEAD AND NECK: CPT

## 2024-08-05 ENCOUNTER — TRANSCRIPTION ENCOUNTER (OUTPATIENT)
Age: 24
End: 2024-08-05

## 2024-12-12 ENCOUNTER — NON-APPOINTMENT (OUTPATIENT)
Age: 24
End: 2024-12-12

## 2024-12-12 ENCOUNTER — APPOINTMENT (OUTPATIENT)
Dept: INTERNAL MEDICINE | Facility: CLINIC | Age: 24
End: 2024-12-12
Payer: COMMERCIAL

## 2024-12-12 VITALS
SYSTOLIC BLOOD PRESSURE: 110 MMHG | HEART RATE: 80 BPM | BODY MASS INDEX: 21.99 KG/M2 | OXYGEN SATURATION: 99 % | DIASTOLIC BLOOD PRESSURE: 70 MMHG | HEIGHT: 65 IN | RESPIRATION RATE: 12 BRPM | WEIGHT: 132 LBS

## 2024-12-12 DIAGNOSIS — L98.9 DISORDER OF THE SKIN AND SUBCUTANEOUS TISSUE, UNSPECIFIED: ICD-10-CM

## 2024-12-12 DIAGNOSIS — Z13.6 ENCOUNTER FOR SCREENING FOR CARDIOVASCULAR DISORDERS: ICD-10-CM

## 2024-12-12 DIAGNOSIS — L71.0 PERIORAL DERMATITIS: ICD-10-CM

## 2024-12-12 DIAGNOSIS — Z00.00 ENCOUNTER FOR GENERAL ADULT MEDICAL EXAMINATION W/OUT ABNORMAL FINDINGS: ICD-10-CM

## 2024-12-12 PROCEDURE — 99395 PREV VISIT EST AGE 18-39: CPT

## 2024-12-12 PROCEDURE — 36415 COLL VENOUS BLD VENIPUNCTURE: CPT

## 2024-12-12 PROCEDURE — 93000 ELECTROCARDIOGRAM COMPLETE: CPT

## 2024-12-13 LAB
ALBUMIN SERPL ELPH-MCNC: 4.5 G/DL
ALP BLD-CCNC: 43 U/L
ALT SERPL-CCNC: 14 U/L
ANION GAP SERPL CALC-SCNC: 15 MMOL/L
APPEARANCE: CLEAR
AST SERPL-CCNC: 18 U/L
BACTERIA: NEGATIVE /HPF
BASOPHILS # BLD AUTO: 0.05 K/UL
BASOPHILS NFR BLD AUTO: 0.6 %
BILIRUB SERPL-MCNC: 0.2 MG/DL
BILIRUBIN URINE: NEGATIVE
BLOOD URINE: NEGATIVE
BUN SERPL-MCNC: 15 MG/DL
CALCIUM SERPL-MCNC: 10 MG/DL
CAST: 0 /LPF
CHLORIDE SERPL-SCNC: 101 MMOL/L
CHOLEST SERPL-MCNC: 332 MG/DL
CO2 SERPL-SCNC: 22 MMOL/L
COLOR: YELLOW
CREAT SERPL-MCNC: 0.85 MG/DL
EGFR: 97 ML/MIN/1.73M2
EOSINOPHIL # BLD AUTO: 0.18 K/UL
EOSINOPHIL NFR BLD AUTO: 2 %
EPITHELIAL CELLS: 1 /HPF
GLUCOSE QUALITATIVE U: NEGATIVE MG/DL
GLUCOSE SERPL-MCNC: 76 MG/DL
HCT VFR BLD CALC: 42.8 %
HDLC SERPL-MCNC: 93 MG/DL
HGB BLD-MCNC: 13.4 G/DL
IMM GRANULOCYTES NFR BLD AUTO: 0.1 %
KETONES URINE: NEGATIVE MG/DL
LDLC SERPL CALC-MCNC: 205 MG/DL
LEUKOCYTE ESTERASE URINE: NEGATIVE
LYMPHOCYTES # BLD AUTO: 3.88 K/UL
LYMPHOCYTES NFR BLD AUTO: 43 %
MAN DIFF?: NORMAL
MCHC RBC-ENTMCNC: 26.9 PG
MCHC RBC-ENTMCNC: 31.3 G/DL
MCV RBC AUTO: 85.9 FL
MICROSCOPIC-UA: NORMAL
MONOCYTES # BLD AUTO: 0.59 K/UL
MONOCYTES NFR BLD AUTO: 6.5 %
NEUTROPHILS # BLD AUTO: 4.32 K/UL
NEUTROPHILS NFR BLD AUTO: 47.8 %
NITRITE URINE: NEGATIVE
NONHDLC SERPL-MCNC: 239 MG/DL
PH URINE: 6
PLATELET # BLD AUTO: 302 K/UL
POTASSIUM SERPL-SCNC: 4.4 MMOL/L
PROT SERPL-MCNC: 7.2 G/DL
PROTEIN URINE: NEGATIVE MG/DL
RBC # BLD: 4.98 M/UL
RBC # FLD: 12.4 %
RED BLOOD CELLS URINE: 0 /HPF
SODIUM SERPL-SCNC: 138 MMOL/L
SPECIFIC GRAVITY URINE: 1.01
TRIGL SERPL-MCNC: 184 MG/DL
TSH SERPL-ACNC: 0.78 UIU/ML
UROBILINOGEN URINE: 0.2 MG/DL
WBC # FLD AUTO: 9.03 K/UL
WHITE BLOOD CELLS URINE: 0 /HPF

## 2025-03-14 ENCOUNTER — APPOINTMENT (OUTPATIENT)
Dept: INTERNAL MEDICINE | Facility: CLINIC | Age: 25
End: 2025-03-14

## 2025-05-22 ENCOUNTER — APPOINTMENT (OUTPATIENT)
Dept: NEUROLOGY | Facility: CLINIC | Age: 25
End: 2025-05-22
Payer: COMMERCIAL

## 2025-05-22 DIAGNOSIS — F90.9 ATTENTION-DEFICIT HYPERACTIVITY DISORDER, UNSPECIFIED TYPE: ICD-10-CM

## 2025-05-22 PROCEDURE — G2211 COMPLEX E/M VISIT ADD ON: CPT | Mod: NC,95

## 2025-05-22 PROCEDURE — 99214 OFFICE O/P EST MOD 30 MIN: CPT | Mod: 95

## 2025-08-11 ENCOUNTER — APPOINTMENT (OUTPATIENT)
Dept: DERMATOLOGY | Facility: CLINIC | Age: 25
End: 2025-08-11
Payer: COMMERCIAL

## 2025-08-11 PROCEDURE — 99213 OFFICE O/P EST LOW 20 MIN: CPT
